# Patient Record
Sex: MALE | Race: WHITE | Employment: OTHER | URBAN - METROPOLITAN AREA
[De-identification: names, ages, dates, MRNs, and addresses within clinical notes are randomized per-mention and may not be internally consistent; named-entity substitution may affect disease eponyms.]

---

## 2019-04-19 ENCOUNTER — HOSPITAL ENCOUNTER (OUTPATIENT)
Age: 49
Discharge: HOME | End: 2019-04-19
Payer: COMMERCIAL

## 2019-04-19 ENCOUNTER — HOSPITAL ENCOUNTER (OUTPATIENT)
Dept: GENERAL RADIOLOGY | Age: 49
Discharge: HOME | End: 2019-04-19
Payer: COMMERCIAL

## 2019-04-19 DIAGNOSIS — M79.671 RIGHT FOOT PAIN: ICD-10-CM

## 2019-04-19 PROCEDURE — 73610 X-RAY EXAM OF ANKLE: CPT

## 2019-04-19 PROCEDURE — 73630 X-RAY EXAM OF FOOT: CPT

## 2019-04-24 ENCOUNTER — HOSPITAL ENCOUNTER (OUTPATIENT)
Dept: VASCULAR LAB | Age: 49
Discharge: HOME | End: 2019-04-24
Payer: COMMERCIAL

## 2019-04-24 PROCEDURE — 93971 EXTREMITY STUDY: CPT

## 2019-05-01 ENCOUNTER — HOSPITAL ENCOUNTER (OUTPATIENT)
Dept: VASCULAR LAB | Age: 49
Discharge: HOME | End: 2019-05-01
Payer: COMMERCIAL

## 2019-05-01 PROCEDURE — 93880 EXTRACRANIAL BILAT STUDY: CPT

## 2019-05-01 PROCEDURE — 93926 LOWER EXTREMITY STUDY: CPT

## 2019-05-17 ENCOUNTER — OFFICE VISIT (OUTPATIENT)
Dept: VASCULAR SURGERY | Age: 49
End: 2019-05-17
Payer: COMMERCIAL

## 2019-05-17 VITALS
DIASTOLIC BLOOD PRESSURE: 92 MMHG | WEIGHT: 271 LBS | OXYGEN SATURATION: 98 % | HEIGHT: 72 IN | BODY MASS INDEX: 36.7 KG/M2 | HEART RATE: 101 BPM | SYSTOLIC BLOOD PRESSURE: 142 MMHG

## 2019-05-17 DIAGNOSIS — M79.606 ISCHEMIC REST PAIN OF LOWER EXTREMITY: ICD-10-CM

## 2019-05-17 DIAGNOSIS — I99.8 ISCHEMIC REST PAIN OF LOWER EXTREMITY: ICD-10-CM

## 2019-05-17 DIAGNOSIS — I73.9 PAD (PERIPHERAL ARTERY DISEASE) (HCC): Primary | ICD-10-CM

## 2019-05-17 DIAGNOSIS — Z72.0 TOBACCO ABUSE: ICD-10-CM

## 2019-05-17 DIAGNOSIS — I25.10 CORONARY ARTERY DISEASE INVOLVING NATIVE CORONARY ARTERY OF NATIVE HEART WITHOUT ANGINA PECTORIS: ICD-10-CM

## 2019-05-17 PROCEDURE — 99243 OFF/OP CNSLTJ NEW/EST LOW 30: CPT | Performed by: SURGERY

## 2019-05-17 PROCEDURE — G8427 DOCREV CUR MEDS BY ELIG CLIN: HCPCS | Performed by: SURGERY

## 2019-05-17 PROCEDURE — G8419 CALC BMI OUT NRM PARAM NOF/U: HCPCS | Performed by: SURGERY

## 2019-05-17 SDOH — HEALTH STABILITY: MENTAL HEALTH: HOW OFTEN DO YOU HAVE A DRINK CONTAINING ALCOHOL?: 4 OR MORE TIMES A WEEK

## 2019-05-17 SDOH — HEALTH STABILITY: MENTAL HEALTH: HOW MANY STANDARD DRINKS CONTAINING ALCOHOL DO YOU HAVE ON A TYPICAL DAY?: 5 OR 6

## 2019-05-17 NOTE — PROGRESS NOTES
Outpatient Consultation / H&P    Date of Consultation:  5/17/2019    PCP:  PHANI Cunningham CNP     Referring Provider:  Dr. Júnior Ibarra     Chief Complaint:   Chief Complaint   Patient presents with    Circulatory Problem     patient was ref by Seble Suárez cnp FOR lower extremity vascular disease. pamlr        History of Present Illness: We are asked to see this patient in consultation by Dr. Júnior Ibarra regarding severe leg pain. Florentino Linton is a 50 y.o. male who has a history of CAD S/P PCI. He reports severe claudication RLE. He can only walk a few feet before severe cramping R calf. He also reports being woken up at night due to pain and cramping in foot. He denies any skin breadown, toe ulcers. Past Medical History:  Past Medical History:   Diagnosis Date    CAD (coronary artery disease)     Hyperlipidemia     Hypertension     [facility] infarction) [facility] [address]     Pulmonary nodule     S/P coronary artery stent placement        Past Surgical History:  Past Surgical History:   Procedure Laterality Date    CORONARY ANGIOPLASTY WITH STENT PLACEMENT  2000's    CORONARY ANGIOPLASTY WITH STENT PLACEMENT  2000's    x 2       Home Medications:   Prior to Admission medications    Medication Sig Start Date End Date Taking?  Authorizing Provider   Multiple Vitamins-Minerals (THERAPEUTIC MULTIVITAMIN-MINERALS) tablet Take 1 tablet by mouth daily   Yes Historical Provider, MD   ibuprofen (ADVIL;MOTRIN) 600 MG tablet Take 1 tablet by mouth every 6 hours as needed for Pain 9/29/15  Yes MARGARETTE Granados   atorvastatin (LIPITOR) 40 MG tablet Take 40 mg by mouth daily   Yes Historical Provider, MD   gabapentin (NEURONTIN) 300 MG capsule Take 300 mg by mouth 3 times daily   Yes Historical Provider, MD   diclofenac (CATAFLAM) 50 MG tablet Take 50 mg by mouth 2 times daily   Yes Historical Provider, MD   naproxen (NAPROSYN) 500 MG tablet Take 500 mg by mouth 2 times daily (with meals)   Yes Historical Provider, MD   ondansetron (ZOFRAN ODT) 4 MG disintegrating tablet Take 1 tablet by mouth every 8 hours as needed for Nausea or Vomiting 12/24/16   Shirley Roach MD   guaiFENesin-dextromethorphan [facility] DM) 100-10 MG/5ML syrup Take 5 mLs by mouth 3 times daily as needed for Cough 12/24/16   Shirley Roach MD        Allergies:  Codeine; Flexeril [cyclobenzaprine hcl]; and Vicodin [hydrocodone-acetaminophen]      Social History:      Social History     Socioeconomic History    Marital status: Single     Spouse name: Not on file    Number of children: Not on file    Years of education: Not on file    Highest education level: Not on file   Occupational History    Occupation: disabled-was a    Social Needs    Financial resource strain: Not on file    Food insecurity:     Worry: Not on file     Inability: Not on file   [employer] needs:     Medical: Not on file     Non-medical: Not on file   Tobacco Use    Smoking status: Current Every Day Smoker     Packs/day: 0.50     Years: 40.00     Pack years: 20.00     Types: Cigarettes    Smokeless tobacco: Never Used    Tobacco comment: 1/2ppd   Substance and Sexual Activity    Alcohol use:  Yes     Alcohol/week: 0.0 oz     Frequency: 4 or more times a week     Drinks per session: 5 or 6     Comment: 6 pack day    Drug use: No    Sexual activity: Not on file   Lifestyle    Physical activity:     Days per week: Not on file     Minutes per session: Not on file    Stress: Not on file   Relationships    Social connections:     Talks on phone: Not on file     Gets together: Not on file     Attends Muslim service: Not on file     Active member of club or organization: Not on file     Attends meetings of clubs or organizations: Not on file     Relationship status: Not on file    Intimate partner violence:     Fear of current or ex partner: Not on file     Emotionally abused: Not on file     Physically abused: Not on file     Forced sexual activity: Not on file   Other Topics Concern    Not on file   Social History Narrative    Not on file       Family History:        Adopted: Yes   Problem Relation Age of Onset    Heart Disease Other         dont know-pt adopted    Heart Failure Neg Hx     Asthma Neg Hx     Cancer Neg Hx     Diabetes Neg Hx     Emphysema Neg Hx     Hypertension Neg Hx        Review of Systems:  A 14 point review of systems was completed. Pertinent positives identified in the HPI, all other review of systems negative. Physical Examination:    BP (!) 142/92 (Site: Right Upper Arm)   Pulse 101   Ht 6' (1.829 m)   Wt 271 lb (122.9 kg)   SpO2 98%   BMI 36.75 kg/m²     Weight: 271 lb (122.9 kg)       General appearance: NAD  Eyes: PERRLA  Neck: no JVD, no lymphadenopathy. Respiratory: effort is unlabored, no crackles, wheezes or rubs. Cardiovascular: regular, no murmur. No carotid bruits. No edema or varicosities. Pulses:    femoral DP PT   RIGHT - - -   LEFT - 2 2   GI: abdomen soft, nondistended, no organomegaly. Musculoskeletal: strength and tone normal.  Extremities: warm and pink. Skin: no dermatitis or ulceration. Neuro/psychiatric: grossly intact. MEDICAL DECISION MAKING/TESTING      Lower extremity duplex:    Impressions   Right Impression   1. The right ankle/brachial index is 0.49.   2. There are no focal elevated velocities in the lower extremity . 3. There is abnormal monophasic flow throughout the lower extremity . 4. There is minimal to moderate plaque seen involving the lower extremity. Left Impression   1. The left ankle/brachial index is 0.74.       Conclusions        Summary        1. There is severe arterial insufficiency at rest involving the right lower    extremity due to inflow disease.    2. The left ankle brachial index is 0.74             Assessment:      Diagnosis Orders   1. PAD (peripheral artery disease) ([address]     2. Ischemic rest pain of lower extremity ([address]     3. Tobacco abuse     4. Coronary artery disease involving native coronary artery of native heart without angina pectoris     5. R Carotid occlusion      Recommendations/Plan:    Angiography with possible intervention. May not be able to cross, if so then will use left radial access. I have discussed all risks (including but not limited to bleeding, thrombosis, contrast allergy, renal failure, and early and late failure of intervention), benefits and alternatives of catheter-based angiography. Patient freely consents and is eager to proceed. All questions and expectations addressed. Asymptomatic Carotid occlusion. Yearly duplex to follow left.       Meño Muñoz MD, FACS

## 2019-05-25 ENCOUNTER — HOSPITAL ENCOUNTER (INPATIENT)
Age: 49
LOS: 1 days | Discharge: HOME | DRG: 174 | End: 2019-05-26
Attending: EMERGENCY MEDICINE | Admitting: INTERNAL MEDICINE
Payer: COMMERCIAL

## 2019-05-25 ENCOUNTER — APPOINTMENT (OUTPATIENT)
Dept: GENERAL RADIOLOGY | Age: 49
DRG: 174 | End: 2019-05-25
Payer: COMMERCIAL

## 2019-05-25 DIAGNOSIS — I21.3 ACUTE ST ELEVATION MYOCARDIAL INFARCTION (STEMI), UNSPECIFIED ARTERY (HCC): Primary | ICD-10-CM

## 2019-05-25 PROBLEM — I21.19 ACUTE TRANSMURAL INFERIOR WALL MI (HCC): Status: ACTIVE | Noted: 2019-05-25

## 2019-05-25 PROBLEM — F10.20 CHRONIC ALCOHOLISM (HCC): Status: ACTIVE | Noted: 2019-05-25

## 2019-05-25 LAB
A/G RATIO: 1.2 (ref 1.1–2.2)
ALBUMIN SERPL-MCNC: 3.9 G/DL (ref 3.4–5)
ALP BLD-CCNC: 68 U/L (ref 40–129)
ALT SERPL-CCNC: 9 U/L (ref 10–40)
ANION GAP SERPL CALCULATED.3IONS-SCNC: 16 MMOL/L (ref 3–16)
APTT: 27.6 SEC (ref 26–36)
APTT: 28.4 SEC (ref 26–36)
AST SERPL-CCNC: 18 U/L (ref 15–37)
BASE EXCESS VENOUS: -7.7 MMOL/L (ref -3–3)
BASOPHILS ABSOLUTE: 0.1 K/UL (ref 0–0.2)
BASOPHILS RELATIVE PERCENT: 1.2 %
BILIRUB SERPL-MCNC: <0.2 MG/DL (ref 0–1)
BUN BLDV-MCNC: 10 MG/DL (ref 7–20)
CALCIUM SERPL-MCNC: 9.3 MG/DL (ref 8.3–10.6)
CARBOXYHEMOGLOBIN: 6.6 % (ref 0–1.5)
CHLORIDE BLD-SCNC: 102 MMOL/L (ref 99–110)
CHOLESTEROL, TOTAL: 217 MG/DL (ref 0–199)
CO2: 21 MMOL/L (ref 21–32)
CREAT SERPL-MCNC: 0.8 MG/DL (ref 0.9–1.3)
EOSINOPHILS ABSOLUTE: 0.2 K/UL (ref 0–0.6)
EOSINOPHILS RELATIVE PERCENT: 2.4 %
GFR AFRICAN AMERICAN: >60
GFR NON-AFRICAN AMERICAN: >60
GLOBULIN: 3.2 G/DL
GLUCOSE BLD-MCNC: 140 MG/DL (ref 70–99)
HCO3 VENOUS: 17.4 MMOL/L (ref 23–29)
HCT VFR BLD CALC: 44.3 % (ref 40.5–52.5)
HDLC SERPL-MCNC: 22 MG/DL (ref 40–60)
HEMOGLOBIN: 15.1 G/DL (ref 13.5–17.5)
INR BLD: 1.09 (ref 0.86–1.14)
LDL CHOLESTEROL CALCULATED: 136 MG/DL
LEFT VENTRICULAR EJECTION FRACTION HIGH VALUE: 40 %
LYMPHOCYTES ABSOLUTE: 2.5 K/UL (ref 1–5.1)
LYMPHOCYTES RELATIVE PERCENT: 25.2 %
MCH RBC QN AUTO: 32 PG (ref 26–34)
MCHC RBC AUTO-ENTMCNC: 34.1 G/DL (ref 31–36)
MCV RBC AUTO: 94.1 FL (ref 80–100)
METHEMOGLOBIN VENOUS: 0.4 %
MONOCYTES ABSOLUTE: 1 K/UL (ref 0–1.3)
MONOCYTES RELATIVE PERCENT: 10.2 %
NEUTROPHILS ABSOLUTE: 6.1 K/UL (ref 1.7–7.7)
NEUTROPHILS RELATIVE PERCENT: 61 %
O2 CONTENT, VEN: 18 VOL %
O2 SAT, VEN: 91 %
O2 THERAPY: ABNORMAL
PCO2, VEN: 34.6 MMHG (ref 40–50)
PDW BLD-RTO: 14 % (ref 12.4–15.4)
PH VENOUS: 7.32 (ref 7.35–7.45)
PLATELET # BLD: 253 K/UL (ref 135–450)
PMV BLD AUTO: 7.9 FL (ref 5–10.5)
PO2, VEN: 65.2 MMHG (ref 25–40)
POTASSIUM REFLEX MAGNESIUM: 4.3 MMOL/L (ref 3.5–5.1)
PRO-BNP: 74 PG/ML (ref 0–124)
PROTHROMBIN TIME: 12.4 SEC (ref 9.8–13)
RBC # BLD: 4.7 M/UL (ref 4.2–5.9)
SODIUM BLD-SCNC: 139 MMOL/L (ref 136–145)
TCO2 CALC VENOUS: 19 MMOL/L
TOTAL PROTEIN: 7.1 G/DL (ref 6.4–8.2)
TRIGL SERPL-MCNC: 296 MG/DL (ref 0–150)
TROPONIN: <0.01 NG/ML
VLDLC SERPL CALC-MCNC: 59 MG/DL
WBC # BLD: 10.1 K/UL (ref 4–11)

## 2019-05-25 PROCEDURE — 027034Z DILATION OF CORONARY ARTERY, ONE ARTERY WITH DRUG-ELUTING INTRALUMINAL DEVICE, PERCUTANEOUS APPROACH: ICD-10-PCS | Performed by: INTERNAL MEDICINE

## 2019-05-25 PROCEDURE — 2000000000 HC ICU R&B

## 2019-05-25 PROCEDURE — 83880 ASSAY OF NATRIURETIC PEPTIDE: CPT

## 2019-05-25 PROCEDURE — 84484 ASSAY OF TROPONIN QUANT: CPT

## 2019-05-25 PROCEDURE — 6360000002 HC RX W HCPCS: Performed by: EMERGENCY MEDICINE

## 2019-05-25 PROCEDURE — C1874 STENT, COATED/COV W/DEL SYS: HCPCS

## 2019-05-25 PROCEDURE — 80061 LIPID PANEL: CPT

## 2019-05-25 PROCEDURE — 94761 N-INVAS EAR/PLS OXIMETRY MLT: CPT

## 2019-05-25 PROCEDURE — C1894 INTRO/SHEATH, NON-LASER: HCPCS

## 2019-05-25 PROCEDURE — 6360000002 HC RX W HCPCS

## 2019-05-25 PROCEDURE — 85610 PROTHROMBIN TIME: CPT

## 2019-05-25 PROCEDURE — 93458 L HRT ARTERY/VENTRICLE ANGIO: CPT

## 2019-05-25 PROCEDURE — 2709999900 HC NON-CHARGEABLE SUPPLY

## 2019-05-25 PROCEDURE — 6360000002 HC RX W HCPCS: Performed by: INTERNAL MEDICINE

## 2019-05-25 PROCEDURE — 6370000000 HC RX 637 (ALT 250 FOR IP)

## 2019-05-25 PROCEDURE — 98960 EDU&TRN PT SELF-MGMT NQHP 1: CPT

## 2019-05-25 PROCEDURE — C1725 CATH, TRANSLUMIN NON-LASER: HCPCS

## 2019-05-25 PROCEDURE — 6360000004 HC RX CONTRAST MEDICATION

## 2019-05-25 PROCEDURE — 85025 COMPLETE CBC W/AUTO DIFF WBC: CPT

## 2019-05-25 PROCEDURE — 2500000003 HC RX 250 WO HCPCS

## 2019-05-25 PROCEDURE — B2111ZZ FLUOROSCOPY OF MULTIPLE CORONARY ARTERIES USING LOW OSMOLAR CONTRAST: ICD-10-PCS | Performed by: INTERNAL MEDICINE

## 2019-05-25 PROCEDURE — 82803 BLOOD GASES ANY COMBINATION: CPT

## 2019-05-25 PROCEDURE — 93000 ELECTROCARDIOGRAM COMPLETE: CPT | Performed by: INTERNAL MEDICINE

## 2019-05-25 PROCEDURE — 2580000003 HC RX 258

## 2019-05-25 PROCEDURE — 99152 MOD SED SAME PHYS/QHP 5/>YRS: CPT

## 2019-05-25 PROCEDURE — 99153 MOD SED SAME PHYS/QHP EA: CPT

## 2019-05-25 PROCEDURE — 4A023N7 MEASUREMENT OF CARDIAC SAMPLING AND PRESSURE, LEFT HEART, PERCUTANEOUS APPROACH: ICD-10-PCS | Performed by: INTERNAL MEDICINE

## 2019-05-25 PROCEDURE — C1769 GUIDE WIRE: HCPCS

## 2019-05-25 PROCEDURE — 85347 COAGULATION TIME ACTIVATED: CPT

## 2019-05-25 PROCEDURE — 83036 HEMOGLOBIN GLYCOSYLATED A1C: CPT

## 2019-05-25 PROCEDURE — 71045 X-RAY EXAM CHEST 1 VIEW: CPT

## 2019-05-25 PROCEDURE — 96375 TX/PRO/DX INJ NEW DRUG ADDON: CPT

## 2019-05-25 PROCEDURE — 2700000000 HC OXYGEN THERAPY PER DAY

## 2019-05-25 PROCEDURE — 92941 PRQ TRLML REVSC TOT OCCL AMI: CPT

## 2019-05-25 PROCEDURE — 6370000000 HC RX 637 (ALT 250 FOR IP): Performed by: INTERNAL MEDICINE

## 2019-05-25 PROCEDURE — 80053 COMPREHEN METABOLIC PANEL: CPT

## 2019-05-25 PROCEDURE — 85730 THROMBOPLASTIN TIME PARTIAL: CPT

## 2019-05-25 PROCEDURE — 92973 PRQ TRLUML C MCHN ASP THRMBC: CPT

## 2019-05-25 PROCEDURE — 96374 THER/PROPH/DIAG INJ IV PUSH: CPT

## 2019-05-25 PROCEDURE — 99291 CRITICAL CARE FIRST HOUR: CPT | Performed by: INTERNAL MEDICINE

## 2019-05-25 PROCEDURE — 93005 ELECTROCARDIOGRAM TRACING: CPT | Performed by: EMERGENCY MEDICINE

## 2019-05-25 PROCEDURE — C1887 CATHETER, GUIDING: HCPCS

## 2019-05-25 PROCEDURE — 2580000003 HC RX 258: Performed by: INTERNAL MEDICINE

## 2019-05-25 PROCEDURE — 99291 CRITICAL CARE FIRST HOUR: CPT

## 2019-05-25 PROCEDURE — 36415 COLL VENOUS BLD VENIPUNCTURE: CPT

## 2019-05-25 RX ORDER — ATORVASTATIN CALCIUM 80 MG/1
80 TABLET, FILM COATED ORAL NIGHTLY
Status: DISCONTINUED | OUTPATIENT
Start: 2019-05-25 | End: 2019-05-26 | Stop reason: HOSPADM

## 2019-05-25 RX ORDER — LISINOPRIL 2.5 MG/1
2.5 TABLET ORAL DAILY
Status: DISCONTINUED | OUTPATIENT
Start: 2019-05-25 | End: 2019-05-26

## 2019-05-25 RX ORDER — GUAIFENESIN/DEXTROMETHORPHAN 100-10MG/5
5 SYRUP ORAL 3 TIMES DAILY PRN
Status: DISCONTINUED | OUTPATIENT
Start: 2019-05-25 | End: 2019-05-26 | Stop reason: HOSPADM

## 2019-05-25 RX ORDER — FENTANYL CITRATE 50 UG/ML
25 INJECTION, SOLUTION INTRAMUSCULAR; INTRAVENOUS ONCE
Status: COMPLETED | OUTPATIENT
Start: 2019-05-25 | End: 2019-05-25

## 2019-05-25 RX ORDER — MORPHINE SULFATE 4 MG/ML
4 INJECTION, SOLUTION INTRAMUSCULAR; INTRAVENOUS ONCE
Status: COMPLETED | OUTPATIENT
Start: 2019-05-25 | End: 2019-05-25

## 2019-05-25 RX ORDER — OXYCODONE HYDROCHLORIDE AND ACETAMINOPHEN 5; 325 MG/1; MG/1
1 TABLET ORAL EVERY 6 HOURS PRN
Status: DISCONTINUED | OUTPATIENT
Start: 2019-05-25 | End: 2019-05-26 | Stop reason: HOSPADM

## 2019-05-25 RX ORDER — FENTANYL CITRATE 50 UG/ML
50 INJECTION, SOLUTION INTRAMUSCULAR; INTRAVENOUS ONCE
Status: COMPLETED | OUTPATIENT
Start: 2019-05-25 | End: 2019-05-25

## 2019-05-25 RX ORDER — ONDANSETRON 2 MG/ML
4 INJECTION INTRAMUSCULAR; INTRAVENOUS ONCE
Status: COMPLETED | OUTPATIENT
Start: 2019-05-25 | End: 2019-05-25

## 2019-05-25 RX ORDER — ONDANSETRON 4 MG/1
4 TABLET, ORALLY DISINTEGRATING ORAL EVERY 8 HOURS PRN
Status: DISCONTINUED | OUTPATIENT
Start: 2019-05-25 | End: 2019-05-26 | Stop reason: HOSPADM

## 2019-05-25 RX ORDER — SODIUM CHLORIDE 9 MG/ML
INJECTION, SOLUTION INTRAVENOUS
Status: COMPLETED
Start: 2019-05-25 | End: 2019-05-25

## 2019-05-25 RX ORDER — SODIUM CHLORIDE 0.9 % (FLUSH) 0.9 %
10 SYRINGE (ML) INJECTION EVERY 12 HOURS SCHEDULED
Status: DISCONTINUED | OUTPATIENT
Start: 2019-05-25 | End: 2019-05-26 | Stop reason: HOSPADM

## 2019-05-25 RX ORDER — SODIUM CHLORIDE 0.9 % (FLUSH) 0.9 %
10 SYRINGE (ML) INJECTION PRN
Status: DISCONTINUED | OUTPATIENT
Start: 2019-05-25 | End: 2019-05-26 | Stop reason: HOSPADM

## 2019-05-25 RX ORDER — MIDAZOLAM HYDROCHLORIDE 1 MG/ML
1 INJECTION INTRAMUSCULAR; INTRAVENOUS ONCE
Status: COMPLETED | OUTPATIENT
Start: 2019-05-25 | End: 2019-05-25

## 2019-05-25 RX ORDER — M-VIT,TX,IRON,MINS/CALC/FOLIC 27MG-0.4MG
1 TABLET ORAL DAILY
Status: DISCONTINUED | OUTPATIENT
Start: 2019-05-25 | End: 2019-05-26 | Stop reason: HOSPADM

## 2019-05-25 RX ORDER — ATROPINE SULFATE 0.1 MG/ML
INJECTION INTRAVENOUS
Status: DISPENSED
Start: 2019-05-25 | End: 2019-05-25

## 2019-05-25 RX ORDER — CARVEDILOL 3.12 MG/1
3.12 TABLET ORAL 2 TIMES DAILY WITH MEALS
Status: DISCONTINUED | OUTPATIENT
Start: 2019-05-25 | End: 2019-05-26

## 2019-05-25 RX ORDER — MORPHINE SULFATE 4 MG/ML
INJECTION, SOLUTION INTRAMUSCULAR; INTRAVENOUS
Status: COMPLETED
Start: 2019-05-25 | End: 2019-05-25

## 2019-05-25 RX ORDER — DOPAMINE HYDROCHLORIDE 160 MG/100ML
5 INJECTION, SOLUTION INTRAVENOUS ONCE
Status: COMPLETED | OUTPATIENT
Start: 2019-05-25 | End: 2019-05-25

## 2019-05-25 RX ORDER — ONDANSETRON 2 MG/ML
INJECTION INTRAMUSCULAR; INTRAVENOUS
Status: COMPLETED
Start: 2019-05-25 | End: 2019-05-25

## 2019-05-25 RX ORDER — ACETAMINOPHEN 325 MG/1
650 TABLET ORAL EVERY 4 HOURS PRN
Status: DISCONTINUED | OUTPATIENT
Start: 2019-05-25 | End: 2019-05-26 | Stop reason: HOSPADM

## 2019-05-25 RX ORDER — SODIUM CHLORIDE 9 MG/ML
1000 INJECTION, SOLUTION INTRAVENOUS CONTINUOUS
Status: DISCONTINUED | OUTPATIENT
Start: 2019-05-25 | End: 2019-05-26

## 2019-05-25 RX ORDER — ASPIRIN 81 MG/1
81 TABLET, CHEWABLE ORAL DAILY
Status: DISCONTINUED | OUTPATIENT
Start: 2019-05-26 | End: 2019-05-26 | Stop reason: HOSPADM

## 2019-05-25 RX ORDER — HEPARIN SODIUM 1000 [USP'U]/ML
4000 INJECTION, SOLUTION INTRAVENOUS; SUBCUTANEOUS ONCE
Status: COMPLETED | OUTPATIENT
Start: 2019-05-25 | End: 2019-05-25

## 2019-05-25 RX ORDER — GABAPENTIN 300 MG/1
300 CAPSULE ORAL 3 TIMES DAILY
Status: DISCONTINUED | OUTPATIENT
Start: 2019-05-25 | End: 2019-05-26 | Stop reason: HOSPADM

## 2019-05-25 RX ORDER — MIDAZOLAM HYDROCHLORIDE 1 MG/ML
2 INJECTION INTRAMUSCULAR; INTRAVENOUS ONCE
Status: COMPLETED | OUTPATIENT
Start: 2019-05-25 | End: 2019-05-25

## 2019-05-25 RX ORDER — HEPARIN SODIUM 1000 [USP'U]/ML
8000 INJECTION, SOLUTION INTRAVENOUS; SUBCUTANEOUS ONCE
Status: COMPLETED | OUTPATIENT
Start: 2019-05-25 | End: 2019-05-25

## 2019-05-25 RX ADMIN — DOPAMINE HYDROCHLORIDE 5 MCG/KG/MIN: 160 INJECTION, SOLUTION INTRAVENOUS at 04:36

## 2019-05-25 RX ADMIN — FENTANYL CITRATE 25 MCG: 50 INJECTION, SOLUTION INTRAMUSCULAR; INTRAVENOUS at 04:52

## 2019-05-25 RX ADMIN — MIDAZOLAM HYDROCHLORIDE 1 MG: 1 INJECTION INTRAMUSCULAR; INTRAVENOUS at 04:52

## 2019-05-25 RX ADMIN — TIROFIBAN 0.15 MCG/KG/MIN: 5 INJECTION, SOLUTION INTRAVENOUS at 04:38

## 2019-05-25 RX ADMIN — FENTANYL CITRATE 25 MCG: 50 INJECTION, SOLUTION INTRAMUSCULAR; INTRAVENOUS at 04:06

## 2019-05-25 RX ADMIN — CARVEDILOL 3.12 MG: 3.12 TABLET, FILM COATED ORAL at 17:15

## 2019-05-25 RX ADMIN — MIDAZOLAM HYDROCHLORIDE 1 MG: 1 INJECTION INTRAMUSCULAR; INTRAVENOUS at 04:35

## 2019-05-25 RX ADMIN — FENTANYL CITRATE 25 MCG: 50 INJECTION, SOLUTION INTRAMUSCULAR; INTRAVENOUS at 04:35

## 2019-05-25 RX ADMIN — SODIUM CHLORIDE 1000 ML: 9 INJECTION, SOLUTION INTRAVENOUS at 17:00

## 2019-05-25 RX ADMIN — HEPARIN SODIUM 8000 UNITS: 1000 INJECTION, SOLUTION INTRAVENOUS; SUBCUTANEOUS at 04:24

## 2019-05-25 RX ADMIN — ONDANSETRON 4 MG: 2 INJECTION INTRAMUSCULAR; INTRAVENOUS at 02:50

## 2019-05-25 RX ADMIN — Medication 10 ML: at 20:34

## 2019-05-25 RX ADMIN — FENTANYL CITRATE 50 MCG: 50 INJECTION, SOLUTION INTRAMUSCULAR; INTRAVENOUS at 03:14

## 2019-05-25 RX ADMIN — MORPHINE SULFATE 4 MG: 4 INJECTION, SOLUTION INTRAMUSCULAR; INTRAVENOUS at 02:51

## 2019-05-25 RX ADMIN — SODIUM CHLORIDE 1000 ML: 9 INJECTION, SOLUTION INTRAVENOUS at 02:51

## 2019-05-25 RX ADMIN — MORPHINE SULFATE 4 MG: 4 INJECTION INTRAVENOUS at 03:02

## 2019-05-25 RX ADMIN — MORPHINE SULFATE 4 MG: 4 INJECTION INTRAVENOUS at 02:51

## 2019-05-25 RX ADMIN — OXYCODONE HYDROCHLORIDE AND ACETAMINOPHEN 1 TABLET: 5; 325 TABLET ORAL at 17:45

## 2019-05-25 RX ADMIN — Medication 10 ML: at 09:22

## 2019-05-25 RX ADMIN — HEPARIN SODIUM 4000 UNITS: 1000 INJECTION, SOLUTION INTRAVENOUS; SUBCUTANEOUS at 03:03

## 2019-05-25 RX ADMIN — MIDAZOLAM HYDROCHLORIDE 2 MG: 1 INJECTION INTRAMUSCULAR; INTRAVENOUS at 03:42

## 2019-05-25 RX ADMIN — GABAPENTIN 300 MG: 300 CAPSULE ORAL at 17:15

## 2019-05-25 RX ADMIN — GABAPENTIN 300 MG: 300 CAPSULE ORAL at 20:27

## 2019-05-25 RX ADMIN — ATORVASTATIN CALCIUM 80 MG: 80 TABLET, FILM COATED ORAL at 20:27

## 2019-05-25 RX ADMIN — TIROFIBAN 0.15 MCG/KG/MIN: 5 INJECTION, SOLUTION INTRAVENOUS at 10:45

## 2019-05-25 RX ADMIN — TICAGRELOR 90 MG: 90 TABLET ORAL at 20:27

## 2019-05-25 RX ADMIN — FENTANYL CITRATE 25 MCG: 50 INJECTION, SOLUTION INTRAMUSCULAR; INTRAVENOUS at 03:42

## 2019-05-25 ASSESSMENT — PAIN DESCRIPTION - LOCATION
LOCATION: BACK
LOCATION: CHEST

## 2019-05-25 ASSESSMENT — PAIN DESCRIPTION - ORIENTATION
ORIENTATION: LOWER
ORIENTATION: LEFT

## 2019-05-25 ASSESSMENT — PAIN DESCRIPTION - PAIN TYPE
TYPE: ACUTE PAIN
TYPE: CHRONIC PAIN

## 2019-05-25 ASSESSMENT — PAIN SCALES - GENERAL
PAINLEVEL_OUTOF10: 0
PAINLEVEL_OUTOF10: 8
PAINLEVEL_OUTOF10: 10
PAINLEVEL_OUTOF10: 8
PAINLEVEL_OUTOF10: 0
PAINLEVEL_OUTOF10: 10
PAINLEVEL_OUTOF10: 10

## 2019-05-25 ASSESSMENT — PAIN DESCRIPTION - PROGRESSION: CLINICAL_PROGRESSION: NOT CHANGED

## 2019-05-25 ASSESSMENT — PAIN DESCRIPTION - FREQUENCY: FREQUENCY: CONTINUOUS

## 2019-05-25 ASSESSMENT — PAIN DESCRIPTION - DIRECTION: RADIATING_TOWARDS: LEFT ARM

## 2019-05-25 ASSESSMENT — PAIN DESCRIPTION - DESCRIPTORS: DESCRIPTORS: CONSTANT

## 2019-05-25 ASSESSMENT — PAIN DESCRIPTION - ONSET: ONSET: ON-GOING

## 2019-05-25 NOTE — ED NOTES
Bed: 09  Expected date:   Expected time:   Means of arrival:   Comments:  NANCY Barboza, CHRIS  05/25/19 0877

## 2019-05-25 NOTE — PROGRESS NOTES
Pt arrived from cath lab. Assumed care of patient. Shift assessment complete and documented on flowsheets. VSS. Four eyes skin assessment and MAR handoff completed with previous RN. Repositioned for comfort. Call light and over bed table within reach. Will continue to monitor.

## 2019-05-25 NOTE — PROGRESS NOTES
[id number] team called in  [phone] Cath team member called and verified they are on their way

## 2019-05-25 NOTE — PROGRESS NOTES
Patient remains in flat postion post cath/post sheath removal.  Back pain has continued to increase on lower left side with radiation down left leg. Patient states this is common when lying flat for a long period of time. Multiple interventions unsuccessful - repositioning, pillow support. PRN pain medication ordered and administered.   Will monitor    Abby Strickland RN

## 2019-05-25 NOTE — PROGRESS NOTES
[address] [facility] Facility: [facility] From: Charleen Booker RE: Diego Alicea 1970 RM: 231 PRN pain medication. patient still remains flat post cath procedure and x2 sheath removals. 8/10 back pain currently. can you order something to help? tried other non-pharmaceutical interventions with no success. Need Callback: NO CALLBACK REQ CCU    DR. Herman michel served     Johanna Hernández RN

## 2019-05-25 NOTE — PROGRESS NOTES
Pt unable to urinate \"trouble peeing when people are looking\" - tried multiple times. Bladder scan - 750 ml - straight cath completed 875 ml removed    Patient comfortable. Remains in flat for sheath removal protocol until 1915 05/25/19.     Adriana Francois RN

## 2019-05-25 NOTE — PRE SEDATION
Brief Pre-Op Note/Sedation Assessment      Leann Gillette  1970  Cath Pool Rm/NONE  [id number]  5:10 AM    Planned Procedure: Cardiac Catheterization Procedure    Post Procedure Plan: Return to same level of care    Consent: I have discussed with the patient and/or the patient representative the indication, alternatives, and the possible risks and/or complications of the planned procedure and the anesthesia methods. The patient and/or patient representative appear to understand and agree to proceed. Chief Complaint: STEMI      Indications for Cath Procedure:  ACS <= 24 hrs  Anginal Classification within 2 weeks:  CCS IV - Inability to perform any activity without angina or angina at rest, i.e., severe limitation  NYHA Heart Failure Class within 2 weeks: No symptoms    Anti- Anginal Meds within 2 weeks:   Yes: Statin (Any)    Stress or Imaging Studies Performed:  None    Vital Signs:  /89   Pulse 102   Resp 10   Ht 6' 2\" (1.88 m)   Wt 271 lb (122.9 kg)   SpO2 98%   BMI 34.79 kg/m²     Allergies:   Allergies   Allergen Reactions    Codeine     Flexeril [Cyclobenzaprine Hcl] Other (See Comments)     Puts him to sleep    Vicodin [Hydrocodone-Acetaminophen]        Past Medical History:  Past Medical History:   Diagnosis Date    CAD (coronary artery disease)     Hyperlipidemia     Hypertension     MI (myocardial infarction) ([address]     [facility] [address]     Pulmonary nodule     S/P coronary artery stent placement          Surgical History:  Past Surgical History:   Procedure Laterality Date    CORONARY ANGIOPLASTY WITH STENT PLACEMENT  2000's    CORONARY ANGIOPLASTY WITH STENT PLACEMENT  2000's    x 2         Medications:  Current Facility-Administered Medications   Medication Dose Route Frequency Provider Last Rate Last Dose    0.9 % sodium chloride infusion  1,000 mL Intravenous Continuous Fatuma Arpan V,  mL/hr at 05/25/19 0251 1,000 mL at 05/25/19 0251    tirofiban (AGGRASTAT) 50 mcg/mL infusion  0.15 mcg/kg/min Intravenous Continuous Pastor Razo MD 22.1 mL/hr at 05/25/19 0438 0.15 mcg/kg/min at 05/25/19 0756           Pre-Sedation:    Pre-Sedation Documentation and Exam:  I have personally completed a history, physical exam & review of systems for this patient (see notes). Prior History of Anesthesia Complications:   none    Modified Mallampati:  I (soft palate, uvula, fauces, tonsillar pillars visible)    ASA Classification:  Class 2 -- A normal healthy patient with mild systemic disease    Anne Scale: Activity:  2 - Able to move 4 extremities voluntarily on command  Respiration:  2 - Able to breathe deeply and cough freely  Circulation:  2 - BP+/- 20mmHg of normal  Consciousness:  2 - Fully awake  Oxygen Saturation (color):  2 - Able to maintain oxygen saturation >92% on room air    Sedation/Anesthesia Plan:  Guard the patient's safety and welfare. Minimize physical discomfort and pain. Minimize negative psychological responses to treatment by providing sedation and analgesia and maximize the potential amnesia. Patient to meet pre-procedure discharge plan.     Medication Planned:  midazolam intravenously and fentanyl intravenously    Patient is an appropriate candidate for plan of sedation: yes      Electronically signed by Pastor Razo MD on 5/25/2019 at 5:10 AM

## 2019-05-25 NOTE — PROGRESS NOTES
Results for Burlene Spatz (MRN [id number]) as of 5/25/2019 09:23   Ref.  Range 5/25/2019 02:55 5/25/2019 08:37   aPTT Latest Ref Range: 26.0 - 36.0 sec 27.6 28.4       Tomasa Mckeon RN

## 2019-05-25 NOTE — H&P
Hospital Medicine History & Physical      PCP: PHANI Martínez CNP    Date of Admission: 5/25/2019    Date of Service: Pt seen/examined on 5/25/19 and Admitted to Inpatient with expected LOS greater than two midnights due to medical therapy. Chief Complaint:  CP      History Of Present Illness:    50 y.o. male who presented to [facility] with CP  Pt presented to the ER with c/o CP, location substernal, radiating to the left arm, started 45 minutes prior to arrival, quality heavy pressure sensation, associated with mild nausea and shortness of breath, no aggravating or relieving factors. Found to have ST elevation in the inferior leads, taken emergently to the Cath Lab and found to have stenosis in the RCA stent. Patient had PCI of the RCA back in 2012 by Dr. Bong Cruz. Past Medical History:          Diagnosis Date    CAD (coronary artery disease)     Hyperlipidemia     Hypertension     MI (myocardial infarction) ([address]     [facility]per[facility]     Pulmonary nodule     S/P coronary artery stent placement        Past Surgical History:          Procedure Laterality Date    CORONARY ANGIOPLASTY WITH STENT PLACEMENT  2000's    CORONARY ANGIOPLASTY WITH STENT PLACEMENT  2000's    x 2       Medications Prior to Admission:      Prior to Admission medications    Medication Sig Start Date End Date Taking?  Authorizing Provider   ondansetron (ZOFRAN ODT) 4 MG disintegrating tablet Take 1 tablet by mouth every 8 hours as needed for Nausea or Vomiting 12/24/16  Yes Michelle Dill MD   ibuprofen (ADVIL;MOTRIN) 600 MG tablet Take 1 tablet by mouth every 6 hours as needed for Pain 9/29/15  Yes MARGARETTE Granados   gabapentin (NEURONTIN) 300 MG capsule Take 300 mg by mouth 3 times daily   Yes Historical Provider, MD   diclofenac (CATAFLAM) 50 MG tablet Take 50 mg by mouth 2 times daily   Yes Historical Provider, MD   naproxen (NAPROSYN) 500 MG tablet Take 500 mg by mouth 2 times deformity. Skin: Skin color, texture, turgor normal.  No rashes or lesions. Neurologic:  Neurovascularly intact without any focal sensory/motor deficits. Cranial nerves: II-XII intact, grossly non-focal.  Psychiatric:  Alert and oriented, thought content appropriate, normal insight  Capillary Refill: Brisk,< 3 seconds   Peripheral Pulses: +2 palpable, equal bilaterally       Labs:     Recent Labs     05/25/19 0255   WBC 10.1   HGB 15.1   HCT 44.3        Recent Labs     05/25/19 0255      K 4.3      CO2 21   BUN 10   CREATININE 0.8*   CALCIUM 9.3     Recent Labs     05/25/19 0255   AST 18   ALT 9*   BILITOT <0.2   ALKPHOS 68     Recent Labs     05/25/19 0255   INR 1.09     Recent Labs     05/25/19 0255   TROPONINI <0.01       Urinalysis:      Lab Results   Component Value Date    BLOODU NEG 07/25/2012    SPECGRAV 1.030 07/25/2012    GLUCOSEU NEG 07/25/2012       Radiology:     CXR: I have reviewed the CXR with the following interpretation: No acute process   EKG:  I have reviewed the EKG with the following interpretation: st elev in inf leads    XR CHEST PORTABLE   Final Result   Low volume study with bibasilar atelectasis or pneumonitis. ASSESSMENT:PLAN:    Acute inferior wall MI  S/p LHC by   100% prox RCA In Stent Thrombosis - Rx with Penumbra Aspiration with large thrombus extracted and reestablished distal perfusion.   70% residual post aspiration lesion stented with 4.0 X 12 Jacinta to 16 TRINI near ostium   - LV gram ef 40%  - on DAPT, statin, BB, ACE-I  - ECHO  - cardiac rehab  - FLP, A1C in AM    CAD  S/p RCA stent in 2012  Patient was not taking the any meds, not even asa, poor compliance  Counseled adherence to medication regimen  Started on guideline meds    Hypotension during cath - treated with touch of vinayak and dopamine in cath Lab, currently on dopamine only, wean as tolerated    Tobacco abuse-counseled cessation    Chronic alcoholism-counseled cessation    Obesity-counseled weight loss, contributing to current presentation      DVT Prophylaxis: scd  Diet: DIET CARDIAC;  Code Status: Full Code    PT/OT Eval Status: NA    Dispo - IP stay    Total critical care time caring for this patient with life threatening, unstable organ failure, including direct patient contact, management of life support systems, review of data including imaging and labs, discussions with other team members and physicians at least 31 min so far today, excluding procedures. Adolfo Russ MD    Thank you PHANI Novak CNP for the opportunity to be involved in this patient's care. If you have any questions or concerns please feel free to contact me at [phone].

## 2019-05-25 NOTE — ED NOTES
Patient transported to cath lab via stretcher at this time via stretcher. Patient is alert during transport with belongings.  Patient does not have family present      [facility]  05/25/19 8051

## 2019-05-25 NOTE — ED PROVIDER NOTES
CHIEF COMPLAINT  Chest Pain (patient reprts chest pain started 45 minutes ago transported via ambulance )      Bryan Gee is a 50 y.o. male who presents to the ED complaining of CP started on L side 45m ago while getting ready for bed. C/o sob. States supposed to have surgery on his left leg next week for a \"nerve pain issue\". Smoker. Hx x2 stents 5 years ago. Not on blood thinners. Got ASA and x1 nitro by EMS. No dizziness or headache. No other complaints, modifying factors or associated symptoms. I have reviewed the following from the nursing documentation.     Past Medical History:   Diagnosis Date    CAD (coronary artery disease)     Hyperlipidemia     Hypertension     [facility] infarction) [facility] [address]     Pulmonary nodule     S/P coronary artery stent placement      Past Surgical History:   Procedure Laterality Date    CORONARY ANGIOPLASTY WITH STENT PLACEMENT  2000's    CORONARY ANGIOPLASTY WITH STENT PLACEMENT  2000's    x 2     Family History   Adopted: Yes   Problem Relation Age of Onset    Heart Disease Other         dont know-pt adopted    Heart Failure Neg Hx     Asthma Neg Hx     Cancer Neg Hx     Diabetes Neg Hx     Emphysema Neg Hx     Hypertension Neg Hx      Social History     Socioeconomic History    Marital status: Single     Spouse name: Not on file    Number of children: Not on file    Years of education: Not on file    Highest education level: Not on file   Occupational History    Occupation: disabled-was a    Social Needs    Financial resource strain: Not on file    Food insecurity:     Worry: Not on file     Inability: Not on file   Basys needs:     Medical: Not on file     Non-medical: Not on file   Tobacco Use    Smoking status: Current Every Day Smoker     Packs/day: 0.50     Years: 40.00     Pack years: 20.00     Types: Cigarettes    Smokeless tobacco: Never Used    Tobacco comment: 1/2ppd   Substance and Sexual  atorvastatin (LIPITOR) 40 MG tablet Take 40 mg by mouth daily      gabapentin (NEURONTIN) 300 MG capsule Take 300 mg by mouth 3 times daily      diclofenac (CATAFLAM) 50 MG tablet Take 50 mg by mouth 2 times daily      naproxen (NAPROSYN) 500 MG tablet Take 500 mg by mouth 2 times daily (with meals)       Allergies   Allergen Reactions    Codeine     Flexeril [Cyclobenzaprine Hcl] Other (See Comments)     Puts him to sleep    Vicodin [Hydrocodone-Acetaminophen]        REVIEW OF SYSTEMS  10 systems reviewed, pertinent positives per HPI otherwise noted to be negative. PHYSICAL EXAM  /76   Pulse 109   Resp (!) 31   Ht 6' 2\" (1.88 m)   Wt 271 lb (122.9 kg)   SpO2 97%   BMI 34.79 kg/m²   GENERAL APPEARANCE: Awake and alert. In pain  HEAD: Normocephalic. Atraumatic. EYES: PERRL. EOM's grossly intact. ENT: Mucous membranes are moist.   NECK: Supple. HEART: RRR. LUNGS: tachypneic. CTAB. Good air exchange. BACK: No midline spinal tenderness or step-off. ABDOMEN: obese. Soft. Non-distended. Non-tender. No guarding or rebound. Normal bowel sounds. EXTREMITIES: No peripheral edema. Moves all extremities equally. All extremities neurovascularly intact. SKIN: pale, diaphoretic  NEUROLOGICAL: Alert and oriented. No gross facial drooping. Strength 5/5. PSYCHIATRIC: Normal mood and affect for situation    LABS  I have reviewed all labs for this visit.    Results for orders placed or performed during the hospital encounter of 05/25/19   CBC Auto Differential   Result Value Ref Range    WBC 10.1 4.0 - 11.0 K/uL    RBC 4.70 4.20 - 5.90 M/uL    Hemoglobin 15.1 13.5 - 17.5 g/dL    Hematocrit 44.3 40.5 - 52.5 %    MCV 94.1 80.0 - 100.0 fL    MCH 32.0 26.0 - 34.0 pg    MCHC 34.1 31.0 - 36.0 g/dL    RDW 14.0 12.4 - 15.4 %    Platelets 981 541 - 390 K/uL    MPV 7.9 5.0 - 10.5 fL    Neutrophils % 61.0 %    Lymphocytes % 25.2 %    Monocytes % 10.2 %    Eosinophils % 2.4 %    Basophils % 1.2 % Neutrophils # 6.1 1.7 - 7.7 K/uL    Lymphocytes # 2.5 1.0 - 5.1 K/uL    Monocytes # 1.0 0.0 - 1.3 K/uL    Eosinophils # 0.2 0.0 - 0.6 K/uL    Basophils # 0.1 0.0 - 0.2 K/uL   Comprehensive Metabolic Panel w/ Reflex to MG   Result Value Ref Range    Sodium 139 136 - 145 mmol/L    Potassium reflex Magnesium 4.3 3.5 - 5.1 mmol/L    Chloride 102 99 - 110 mmol/L    CO2 21 21 - 32 mmol/L    Anion Gap 16 3 - 16    Glucose 140 (H) 70 - 99 mg/dL    BUN 10 7 - 20 mg/dL    CREATININE 0.8 (L) 0.9 - 1.3 mg/dL    GFR Non-African American >60 >60    GFR African American >60 >60    Calcium 9.3 8.3 - 10.6 mg/dL    Total Protein 7.1 6.4 - 8.2 g/dL    Alb 3.9 3.4 - 5.0 g/dL    Albumin/Globulin Ratio 1.2 1.1 - 2.2    Total Bilirubin <0.2 0.0 - 1.0 mg/dL    Alkaline Phosphatase 68 40 - 129 U/L    ALT 9 (L) 10 - 40 U/L    AST 18 15 - 37 U/L    Globulin 3.2 g/dL   Troponin   Result Value Ref Range    Troponin <0.01 <0.01 ng/mL   Blood Gas, Venous   Result Value Ref Range    pH, Luisito 7.320 (L) 7.350 - 7.450    pCO2, Luisito 34.6 (L) 40.0 - 50.0 mmHg    pO2, Luisito 65.2 (H) 25.0 - 40.0 mmHg    HCO3, Venous 17.4 (L) 23.0 - 29.0 mmol/L    Base Excess, Luisito -7.7 (L) -3.0 - 3.0 mmol/L    O2 Sat, Luisito 91 Not Established %    Carboxyhemoglobin 6.6 (H) 0.0 - 1.5 %    MetHgb, Luisito 0.4 <1.5 %    TC02 (Calc), Luisito 19 Not Established mmol/L    O2 Content, Luisito 18 Not Established VOL %    O2 Therapy Unknown    Protime-INR   Result Value Ref Range    Protime 12.4 9.8 - 13.0 sec    INR 1.09 0.86 - 1.14   Brain Natriuretic Peptide   Result Value Ref Range    Pro-BNP 74 0 - 124 pg/mL   APTT   Result Value Ref Range    aPTT 27.6 26.0 - 36.0 sec   Lipid panel   Result Value Ref Range    Cholesterol, Total 217 (H) 0 - 199 mg/dL    Triglycerides 296 (H) 0 - 150 mg/dL    HDL 22 (L) 40 - 60 mg/dL    LDL Calculated 136 (H) <100 mg/dL    VLDL Cholesterol Calculated 59 Not Established mg/dL   APTT   Result Value Ref Range    aPTT 28.4 26.0 - 36.0 sec   Ejection Fraction Percentage   Result Value Ref Range    Left Ventricular Ejection Fraction High Value 40 %       EKG  EKG interpreted by me. STEMI, St elevations in II, III, aVF with depressions in 1, aVL, V2.       RADIOLOGY  X-RAYS:  I have reviewed radiologic plain film image(s). ALL OTHER NON-PLAIN FILM IMAGES SUCH AS CT, ULTRASOUND AND MRI HAVE BEEN READ BY THE RADIOLOGIST. XR CHEST PORTABLE   Final Result   Low volume study with bibasilar atelectasis or pneumonitis. CRITICAL CARE TIME ATTESTATION (40 minutes):    Due to the high probability of imminent or life-threatening deterioration secondary to dyspnea, possible infection, and new arrhythmia this patient required my direct attention, interventions and personal management. I personally provided 40 minutes of critical care time exclusive of time spent on separate billable procedures. Time includes review and interpretation of laboratory data, review and interpretation of radiology results, discussions with consultants as applicable while monitoring for potential decompensation. Interventions were otherwise performed as documented above. ED COURSE/MDM  Patient seen and evaluated. EKG consistent with inferior wall STEMI, VSS at this time aside from . PT diaphoretic on arrival. Got x3 ASA and x1 nitro from EMS wo relief. Given morphine 8mg IVP here for pain. Spoke with Dr Santos Rao of cardiology and texted him the EKG, agrees stemi. We will activate cath lab. Will also give fluids here and will give bolus of Heparin 4000U IV. Will hold any nitro for now. Pt was still in pain, now pain is better after some fentanyl. [place] Vega [address] [facility] with [address] [facility] Bird Fees       8727 - EKG texted to Cardiologist     [id number] - Cath lab activated     CLINICAL IMPRESSION  1. Acute ST elevation myocardial infarction (STEMI), unspecified artery (HCC)        Blood pressure 119/76, pulse 109, resp.  rate (!) 31, height 6' 2\" (1.88 m), weight 271 lb

## 2019-05-25 NOTE — PROGRESS NOTES
[address]   Daily Cardiovascular Progress Note    Admit Date: 5/25/2019    Chief complaint: chest pain  HPI:     Pt presented o/n with acute inferior stemi and had emergency cath with PCI of RCA. Doing better, fatigue/tired.        Medications/Labs all Reviewed:  Patient Active Problem List   Diagnosis    Acute inferior myocardial infarction ([facility] [address]    CAD (coronary artery disease)    Status post angioplasty with stent    HTN (hypertension)    Trochanteric bursitis    Mediastinal lymphadenopathy    Dyspnea    Tobacco abuse    Daytime somnolence    Obesity    Snoring    Acute transmural inferior wall MI (HCC)    Chronic alcoholism (HCC)       Medications:    0.9 % sodium chloride infusion Continuous   gabapentin (NEURONTIN) capsule 300 mg TID   therapeutic multivitamin-minerals 1 tablet Daily   ondansetron (ZOFRAN-ODT) disintegrating tablet 4 mg Q8H PRN   guaiFENesin-dextromethorphan (ROBITUSSIN DM) 100-10 MG/5ML syrup 5 mL TID PRN   sodium chloride flush 0.9 % injection 10 mL 2 times per day   sodium chloride flush 0.9 % injection 10 mL PRN   acetaminophen (TYLENOL) tablet 650 mg Q4H PRN   magnesium hydroxide (MILK OF MAGNESIA) 400 MG/5ML suspension 30 mL Daily PRN   carvedilol (COREG) tablet 3.125 mg BID WC   lisinopril (PRINIVIL;ZESTRIL) tablet 2.5 mg Daily   atorvastatin (LIPITOR) tablet 80 mg Nightly   ticagrelor (BRILINTA) tablet 90 mg BID   [START ON 5/26/2019] aspirin chewable tablet 81 mg Daily   perflutren lipid microspheres (DEFINITY) injection 1.65 mg ONCE PRN   atropine 1 MG/10ML injection           PHYSICAL EXAM   /70   Pulse 91   Temp 97.8 °F (36.6 °C) (Axillary)   Resp 18   Ht 6' 2\" (1.88 m)   Wt 279 lb 1.6 oz (126.6 kg)   SpO2 99%   BMI 35.83 kg/m²    Vitals:    05/25/19 1100 05/25/19 1200 05/25/19 1207 05/25/19 1300   BP: 103/69 122/77 107/79 104/70   Pulse: 95 97 95 91   Resp: 16 18 20 18   Temp:       TempSrc:       SpO2: 98% 98% 98% 99%   Weight: Height:           No intake or output data in the 24 hours ending 05/25/19 1408  Wt Readings from Last 3 Encounters:   05/25/19 279 lb 1.6 oz (126.6 kg)   05/17/19 271 lb (122.9 kg)   11/02/17 280 lb (127 kg)         Gen: Patient in NAD, resting comfortably  Neck: No JVD or bruits  Respiratory: CTAB no WRR  Chest: normal without deformity  Cardiovascular:RRR, S1S2,   Abdomen: Soft, NTND, Normal BS  Extremities: No clubbing, cyanosis, or edema  Neurological/Psychiatric: AxO x4, No gross motors/sensory deficits  Skin:  Warm and dry  bilat Groin: sheaths in place, +oozing      Labs:  CBC: Recent Labs     05/25/19  0255   WBC 10.1   HGB 15.1   HCT 44.3   MCV 94.1        BMP: Recent Labs     05/25/19  0255      K 4.3      CO2 21   BUN 10   CREATININE 0.8*     MG:  No results for input(s): MG in the last 72 hours. PT/INR:   Recent Labs     05/25/19  0255   PROTIME 12.4   INR 1.09     APTT:   Recent Labs     05/25/19  0255 05/25/19  0837   APTT 27.6 28.4     Cardiac Enzymes: Recent Labs     05/25/19 0255   TROPONINI <0.01       Cardiac Studies:    Cath:    Ejection Fraction Percentage   Order: [id number]   Status:  Final result   Visible to patient:  No (Not Released) Next appt:  05/28/2019 at 07:30 AM in IP Unit (SCHEDULE, [facility] CATH LAB [facility])    Ref Range & Units 5/25/19   Left Ventricular Ejection Fraction High Value % 40    Comment: cath,inferior hypokinesis               I have reviewed labs and imaging/xray/diagnostic testing in this note.     Assessment and Plan       Patient Active Problem List   Diagnosis    Acute inferior myocardial infarction ([facility] [address]    CAD (coronary artery disease)    Status post angioplasty with stent    HTN (hypertension)    Trochanteric bursitis    Mediastinal lymphadenopathy    Dyspnea    Tobacco abuse    Daytime somnolence    Obesity    Snoring    Acute transmural inferior wall MI ([address]    Chronic alcoholism ([facility] [address]       Acute inferior stemi,

## 2019-05-25 NOTE — ED NOTES
She at bedside discussing plan of care and procedure with patient at this time     Kera Villa, RN  05/25/19 3779

## 2019-05-25 NOTE — PROGRESS NOTES
ACT drawn and resulted below 170 per order. Patient currently on aggrastat and dopamine gtts. Patient instructed on removal procedure. Right venous sheath removed at 1055. Venous sheath removed without hematoma or oozing. Pulses present distally. Arterial sheath site without hematoma or oozing. Arterial sheath removed per policy @ 6550 without difficulty. Integrity of sheath inspected upon removal and no abnormalities noted. Manual pressure held 28 minutes minutes. Dry, sterile tegaderm applied. Pressure dressing applied. Patient tolerated well. Vital signs, groin checks, and pedal pulses will be completed per protocol (every 15 minutes X 4, every 30 minutes X 2, and every hour X 2 per protocol). Sheath removed by  Joaquin Zamorano with robert Walker RN present.      Brittney Carroll RN

## 2019-05-25 NOTE — CONSULTS
[address]   Cardiac Consultation    Referring Provider:  PHANI Johnson CNP     Chief Complaint   Patient presents with    Chest Pain     patient reprts chest pain started 45 minutes ago transported via ambulance         History of Present Illness:  49 yo describes onset of severe SSCP with radiation to L jaw and arm starting 45 minutes prior to arrival in the ER. Currently non-pleuritic with mild SOB. No palpitations or syncope. Drank 5 beers tonight and smokes cigarettes. Denies any drug use. ECG reviewed demonstrating inferior GUSTAVO. Hx of 4.0 X 38 RCA stent. No current AP RX. Past Medical History:   has a past medical history of CAD (coronary artery disease), Hyperlipidemia, Hypertension, MI (myocardial infarction) ([facility] [address] Pulmonary nodule, and S/P coronary artery stent placement. Surgical History:   has a past surgical history that includes Coronary angioplasty with stent (2000's) and Coronary angioplasty with stent (2000's). Social History:   reports that he has been smoking cigarettes. He has a 20.00 pack-year smoking history. He has never used smokeless tobacco. He reports that he drinks alcohol. He reports that he does not use drugs. Family History:  family history includes Heart Disease in an other family member. He was adopted. Home Medications:  See List    Allergies:  Codeine; Flexeril [cyclobenzaprine hcl]; and Vicodin [hydrocodone-acetaminophen]     Review of Systems:   · Constitutional: there has been no unanticipated weight loss. There's been no change in energy level, sleep pattern, or activity level. · Eyes: No visual changes or diplopia. No scleral icterus. · ENT: No Headaches, hearing loss or vertigo. No mouth sores or sore throat. · Cardiovascular: Reviewed in HPI  · Respiratory: No cough or wheezing, no sputum production. No hematemesis. · Gastrointestinal: No abdominal pain, appetite loss, blood in stools.  No change in bowel or bladder habits. · Genitourinary: No dysuria, trouble voiding, or hematuria. · Musculoskeletal:  No gait disturbance, weakness or joint complaints. · Integumentary: No rash or pruritis. · Neurological: No headache, diplopia, change in muscle strength, numbness or tingling. No change in gait, balance, coordination, mood, affect, memory, mentation, behavior. · Psychiatric: No anxiety, no depression. · Endocrine: No malaise, fatigue or temperature intolerance. No excessive thirst, fluid intake, or urination. No tremor. · Hematologic/Lymphatic: No abnormal bruising or bleeding, blood clots or swollen lymph nodes. · Allergic/Immunologic: No nasal congestion or hives. Physical Examination:    Vitals:    05/25/19 0323   BP: 103/89   Pulse: 102   Resp: 10   SpO2: 98%          Constitutional and General Appearance:   . Uncomfortable   SKIN:  .     Warm and dry  EYES:    .     EOMI  Neck:   . Normal carotid contour  Respiratory:  · Normal excursion and expansion without use of accessory muscles  · Resp Auscultation: Normal breath sounds without dullness  Cardiovascular:  · The apical impulses not displaced  · Heart tones are crisp and normal  · Cervical veins are not engorged  · Normal S1S2, No S3, No Murmur  · Peripheral pulses are symmetrical and full  Extremities:  · There is no clubbing, cyanosis of the extremities. · No edema  · Femoral Arteries: 2+ and equal  · Pedal Pulses: 2+ and equal   Abdomen:  · No masses or tenderness  · Liver/Spleen: No Abnormalities Noted  Neurological/Psychiatric:  · Alert and oriented in all spheres  · Moves all extremities well  · Exhibits normal gait balance and coordination  · No abnormalities of mood, affect, memory, mentation, or behavior are noted    All testing and labs listed below were personally reviewed. Discussed with the ER  Charts reviewed/ECG transmitted--Prior Cine reviewed    Assessment:     1.  Acute ST elevation myocardial infarction (STEMI), unspecified artery ([facility] [address]    2. Tobacco Use  3. Hx CAD with prior stents    Plan:  Cath/? PCI--Procedure and risks explained. Total critical care time was 40 minutes, excluding separately reportable procedures. Services, included in critical care time were chart data review, documentation time, obtaining info from patient, review of nursing notes, and vital sign assessment and management of the patient. There was a high probability of clinically significant life-threatening deterioration in the patient's condition, which required my urgent intervention. Due to the high probability of clinically significant life threating deterioration of the patient's condition that required my urgent intervention, a total critical care time 40 minutes was used. This time excludes any time that may have been spent performing procedures. This includes but not limited to vital sign monitoring, telemetry monitoring, continuous pulse oximety, IV medication, clinical response to the IV medications, documentation time , consultation time, interpretation of lab data, review of nursing notes and old record review. Temitope Kimbrough M.D., [facility].

## 2019-05-26 VITALS
SYSTOLIC BLOOD PRESSURE: 133 MMHG | DIASTOLIC BLOOD PRESSURE: 89 MMHG | BODY MASS INDEX: 35.82 KG/M2 | RESPIRATION RATE: 18 BRPM | TEMPERATURE: 97.3 F | OXYGEN SATURATION: 99 % | WEIGHT: 279.1 LBS | HEIGHT: 74 IN | HEART RATE: 97 BPM

## 2019-05-26 LAB
ALBUMIN SERPL-MCNC: 3.2 G/DL (ref 3.4–5)
ALP BLD-CCNC: 57 U/L (ref 40–129)
ALT SERPL-CCNC: 12 U/L (ref 10–40)
ANION GAP SERPL CALCULATED.3IONS-SCNC: 9 MMOL/L (ref 3–16)
AST SERPL-CCNC: 59 U/L (ref 15–37)
BILIRUB SERPL-MCNC: 0.3 MG/DL (ref 0–1)
BILIRUBIN DIRECT: <0.2 MG/DL (ref 0–0.3)
BILIRUBIN, INDIRECT: ABNORMAL MG/DL (ref 0–1)
BUN BLDV-MCNC: 7 MG/DL (ref 7–20)
CALCIUM SERPL-MCNC: 8.6 MG/DL (ref 8.3–10.6)
CHLORIDE BLD-SCNC: 108 MMOL/L (ref 99–110)
CO2: 22 MMOL/L (ref 21–32)
CREAT SERPL-MCNC: 0.6 MG/DL (ref 0.9–1.3)
EKG ATRIAL RATE: 89 BPM
EKG DIAGNOSIS: NORMAL
EKG P AXIS: 51 DEGREES
EKG P-R INTERVAL: 150 MS
EKG Q-T INTERVAL: 352 MS
EKG QRS DURATION: 86 MS
EKG QTC CALCULATION (BAZETT): 428 MS
EKG R AXIS: 69 DEGREES
EKG T AXIS: 39 DEGREES
EKG VENTRICULAR RATE: 89 BPM
ESTIMATED AVERAGE GLUCOSE: 125.5 MG/DL
GFR AFRICAN AMERICAN: >60
GFR NON-AFRICAN AMERICAN: >60
GLUCOSE BLD-MCNC: 98 MG/DL (ref 70–99)
HBA1C MFR BLD: 6 %
LV EF: 55 %
LVEF MODALITY: NORMAL
MAGNESIUM: 2 MG/DL (ref 1.8–2.4)
POTASSIUM SERPL-SCNC: 3.9 MMOL/L (ref 3.5–5.1)
SODIUM BLD-SCNC: 139 MMOL/L (ref 136–145)
TOTAL PROTEIN: 6.2 G/DL (ref 6.4–8.2)

## 2019-05-26 PROCEDURE — 83735 ASSAY OF MAGNESIUM: CPT

## 2019-05-26 PROCEDURE — 6370000000 HC RX 637 (ALT 250 FOR IP): Performed by: INTERNAL MEDICINE

## 2019-05-26 PROCEDURE — 93010 ELECTROCARDIOGRAM REPORT: CPT | Performed by: INTERNAL MEDICINE

## 2019-05-26 PROCEDURE — 93005 ELECTROCARDIOGRAM TRACING: CPT | Performed by: INTERNAL MEDICINE

## 2019-05-26 PROCEDURE — 36415 COLL VENOUS BLD VENIPUNCTURE: CPT

## 2019-05-26 PROCEDURE — 6360000004 HC RX CONTRAST MEDICATION: Performed by: INTERNAL MEDICINE

## 2019-05-26 PROCEDURE — 2580000003 HC RX 258: Performed by: INTERNAL MEDICINE

## 2019-05-26 PROCEDURE — 80076 HEPATIC FUNCTION PANEL: CPT

## 2019-05-26 PROCEDURE — C8929 TTE W OR WO FOL WCON,DOPPLER: HCPCS

## 2019-05-26 PROCEDURE — 99233 SBSQ HOSP IP/OBS HIGH 50: CPT | Performed by: INTERNAL MEDICINE

## 2019-05-26 PROCEDURE — 80048 BASIC METABOLIC PNL TOTAL CA: CPT

## 2019-05-26 RX ORDER — ATORVASTATIN CALCIUM 80 MG/1
80 TABLET, FILM COATED ORAL NIGHTLY
Qty: 30 TABLET | Refills: 3 | Status: SHIPPED | OUTPATIENT
Start: 2019-05-26

## 2019-05-26 RX ORDER — CARVEDILOL 3.12 MG/1
3.12 TABLET ORAL 2 TIMES DAILY WITH MEALS
Qty: 60 TABLET | Refills: 3 | Status: SHIPPED | OUTPATIENT
Start: 2019-05-26 | End: 2019-05-26 | Stop reason: HOSPADM

## 2019-05-26 RX ORDER — ASPIRIN 81 MG/1
81 TABLET, CHEWABLE ORAL DAILY
Qty: 30 TABLET | Refills: 3 | Status: SHIPPED | OUTPATIENT
Start: 2019-05-27

## 2019-05-26 RX ORDER — LISINOPRIL 2.5 MG/1
2.5 TABLET ORAL DAILY
Qty: 30 TABLET | Refills: 3 | Status: SHIPPED | OUTPATIENT
Start: 2019-05-27 | End: 2019-05-26 | Stop reason: HOSPADM

## 2019-05-26 RX ORDER — CARVEDILOL 6.25 MG/1
6.25 TABLET ORAL 2 TIMES DAILY WITH MEALS
Status: DISCONTINUED | OUTPATIENT
Start: 2019-05-26 | End: 2019-05-26 | Stop reason: HOSPADM

## 2019-05-26 RX ORDER — LISINOPRIL 5 MG/1
5 TABLET ORAL DAILY
Status: DISCONTINUED | OUTPATIENT
Start: 2019-05-27 | End: 2019-05-26 | Stop reason: HOSPADM

## 2019-05-26 RX ORDER — CARVEDILOL 6.25 MG/1
6.25 TABLET ORAL 2 TIMES DAILY WITH MEALS
Qty: 60 TABLET | Refills: 3 | Status: SHIPPED | OUTPATIENT
Start: 2019-05-26 | End: 2022-04-20

## 2019-05-26 RX ORDER — LISINOPRIL 5 MG/1
5 TABLET ORAL DAILY
Qty: 30 TABLET | Refills: 3 | Status: SHIPPED | OUTPATIENT
Start: 2019-05-27 | End: 2022-04-20

## 2019-05-26 RX ADMIN — Medication 1 TABLET: at 08:42

## 2019-05-26 RX ADMIN — ASPIRIN 81 MG 81 MG: 81 TABLET ORAL at 08:42

## 2019-05-26 RX ADMIN — OXYCODONE HYDROCHLORIDE AND ACETAMINOPHEN 1 TABLET: 5; 325 TABLET ORAL at 01:11

## 2019-05-26 RX ADMIN — CARVEDILOL 3.12 MG: 3.12 TABLET, FILM COATED ORAL at 08:42

## 2019-05-26 RX ADMIN — GABAPENTIN 300 MG: 300 CAPSULE ORAL at 08:42

## 2019-05-26 RX ADMIN — TICAGRELOR 90 MG: 90 TABLET ORAL at 08:42

## 2019-05-26 RX ADMIN — LISINOPRIL 2.5 MG: 2.5 TABLET ORAL at 08:42

## 2019-05-26 RX ADMIN — Medication 10 ML: at 08:44

## 2019-05-26 RX ADMIN — PERFLUTREN 1.65 MG: 6.52 INJECTION, SUSPENSION INTRAVENOUS at 09:16

## 2019-05-26 ASSESSMENT — PAIN DESCRIPTION - ORIENTATION: ORIENTATION: LOWER

## 2019-05-26 ASSESSMENT — PAIN SCALES - GENERAL
PAINLEVEL_OUTOF10: 8
PAINLEVEL_OUTOF10: 3

## 2019-05-26 ASSESSMENT — PAIN DESCRIPTION - LOCATION: LOCATION: BACK

## 2019-05-26 NOTE — DISCHARGE SUMMARY
Hospital Medicine Discharge Summary    Patient ID: Dolores Velazco      Patient's PCP: PHANI Schmitt CNP    Admit Date: 5/25/2019     Discharge Date:  05/26/19    Admitting Physician: Sandor Torres MD     Discharge Physician: Selwyn Menchaca MD     Discharge Diagnoses: Active Hospital Problems    Diagnosis    Acute transmural inferior wall MI ([facility] [address] [I21.19]    Chronic alcoholism ([facility] [address] [F10.20]    Tobacco abuse [Z72.0]    Obesity [E66.9]    CAD (coronary artery disease) [I25.10]    HTN (hypertension) [I10]       The patient was seen and examined on day of discharge and this discharge summary is in conjunction with any daily progress note from day of discharge. HPI taken from admission H&P : 50 y.o. male who presented to [facility] with CP . location substernal, radiating to the left arm, started 45 minutes prior to arrival, quality heavy pressure sensation, associated with mild nausea and shortness of breath, no aggravating or relieving factors. Found to have ST elevation in the inferior leads, taken emergently to the Cath Lab and found to have stenosis in the RCA stent. Patient had PCI of the RCA back in 2012 by Dr. Mcgrath Crittenden Course:  By Problem List   Acute inferior wall MI  S/p LHC by   100% prox RCA In Stent Thrombosis - Rx with Penumbra Aspiration with large thrombus extracted and reestablished distal perfusion.  70% residual post aspiration lesion stented with 4.0 X 12 Jacinta to 16 TRINI near ostium   - LV gram ef 40%  - on DAPT, statin, BB, ACE-I  - ECHO reviewed   - cardiac rehab  - FLP, A1C - reviewed      CAD  S/p RCA stent in 2012  Patient was not taking the any meds, not even asa, poor compliance  Counseled adherence to medication regimen  Started on guideline meds     Hypotension during cath - treated with touch of vinayak and dopamine in cath Lab,  weaned  as tolerated     Tobacco abuse-counseled cessation     Chronic alcoholism-counseled cessation     Obesity-counseled weight loss, contributing to current presentation    Patient very eager to be discharged . He got discharged home in stable medical condition     Physical Exam Performed:   /89   Pulse 91   Temp 97.3 °F (36.3 °C) (Oral)   Resp 18   Ht 6' 2\" (1.88 m)   Wt 279 lb 1.6 oz (126.6 kg)   SpO2 99%   BMI 35.83 kg/m²       General appearance:  No apparent distress, appears stated age and cooperative. HEENT:  Normal cephalic, atraumatic without obvious deformity. Pupils equal, round, and reactive to light. Extra ocular muscles intact. Conjunctivae/corneas clear. Neck: Supple, with full range of motion. No jugular venous distention. Trachea midline. Respiratory:  Normal respiratory effort. Clear to auscultation, bilaterally without Rales/Wheezes/Rhonchi. Cardiovascular:  Regular rate and rhythm with normal S1/S2 without murmurs, rubs or gallops. Abdomen: Soft, non-tender, non-distended with normal bowel sounds. Musculoskeletal:  No clubbing, cyanosis or edema bilaterally. Full range of motion without deformity. Skin: Skin color, texture, turgor normal.  No rashes or lesions. Neurologic:  Neurovascularly intact without any focal sensory/motor deficits. Cranial nerves: II-XII intact, grossly non-focal.  Psychiatric:  Alert and oriented, thought content appropriate, normal insight  Capillary Refill: Brisk,< 3 seconds   Peripheral Pulses: +2 palpable, equal bilaterally       Labs:  For convenience and continuity at follow-up the following most recent labs are provided:      CBC:    Lab Results   Component Value Date    WBC 10.1 05/25/2019    HGB 15.1 05/25/2019    HCT 44.3 05/25/2019     05/25/2019       Renal:    Lab Results   Component Value Date     05/26/2019    K 3.9 05/26/2019    K 4.3 05/25/2019     05/26/2019    CO2 22 05/26/2019    BUN 7 05/26/2019    CREATININE 0.6 05/26/2019    CALCIUM 8.6 05/26/2019    PHOS 4.3 07/25/2012     Significant Diagnostic

## 2019-05-26 NOTE — PROGRESS NOTES
D/c orders in place as long as cardiology okay to dc pt. Spoke to Dr. Micha Nails who states pt was okay to go and he just needed to modify med orders.

## 2019-05-26 NOTE — PLAN OF CARE
Problem: Falls - Risk of  Goal: Absence of falls  Outcome: Ongoing  Pt remains a fall risk. See Glorianne Quita Fall Score. Fall risk bundle in place. Pt bed is in low position with bed alarm on, side rails up, fall risk bracelet and non-skid footwear in use. Will continue to monitor and reassess barnes fall risk. Problem: Pain:  Goal: Control of acute pain  Outcome: Ongoing  Pt able to respond appropriately to pain assessments; 0-10 scale being used. See doc flow for pain assessment details.  Will cont to monitor.

## 2019-05-26 NOTE — PROGRESS NOTES
EXTREMITIES: No lower extremity edema. Motor movement grossly intact. No cyanosis or clubbing. Current Inpatient Medications:   gabapentin  300 mg Oral TID    therapeutic multivitamin-minerals  1 tablet Oral Daily    sodium chloride flush  10 mL Intravenous 2 times per day    carvedilol  3.125 mg Oral BID WC    lisinopril  2.5 mg Oral Daily    atorvastatin  80 mg Oral Nightly    ticagrelor  90 mg Oral BID    aspirin  81 mg Oral Daily      sodium chloride Stopped (05/26/19 0630)       Diagnostics:  reviewed  Cardiac Angiography:    Labs:   CBC:   Recent Labs     05/25/19 0255   WBC 10.1   RBC 4.70   HGB 15.1   HCT 44.3   MCV 94.1   RDW 14.0        BMP:  Recent Labs     05/25/19 0255 05/26/19  0411    139   K 4.3 3.9    108   CO2 21 22   BUN 10 7   CREATININE 0.8* 0.6*     BNP: No results for input(s): BNP in the last 72 hours. PT/INR:   Recent Labs     05/25/19 0255 05/26/19 0411   PROT 7.1 6.2*   INR 1.09  --      APTT:  Recent Labs     05/25/19 0255 05/25/19  0837   APTT 27.6 28.4     CARDIAC ENZYMES:  Recent Labs     05/25/19 0255   TROPONINI <0.01     FASTING LIPID PANEL:  Lab Results   Component Value Date    CHOL 217 05/25/2019    HDL 22 05/25/2019    TRIG 296 05/25/2019     LIVER PROFILE:  Recent Labs     05/25/19 0255 05/26/19  0411   AST 18 59*   ALT 9* 12   BILIDIR  --  <0.2   BILITOT <0.2 0.3   ALKPHOS 68 62       ASSESSMENT AND PLAN:    Patient Active Problem List   Diagnosis    [facility] inferior [facility]    CAD (coronary artery disease)    Status post angioplasty with stent    HTN (hypertension)    Trochanteric bursitis    Mediastinal lymphadenopathy    Dyspnea    Tobacco abuse    Daytime somnolence    Obesity    Snoring    Acute transmural inferior wall MI (HCC)    Chronic alcoholism ([facility] [address]       1. Essential hypertension   -increase Coreg to 6.25 mg bid   -increase lisinopril to 5 mg daily    2.  ST elevation MI   -DAPT   -statin    3. Smoking   -Risks of smoking reviewed and cessation options discussed     4. Obesity   -The patient is asked to make an attempt to improve diet and exercise patterns to aid in medical management of this problem. Ok to discharge from cardiac standpoint     Please see orders. Discussed with patient and nursing. Thank you for asking me to assist in the care of this patient. Please contact me if you have any questions regarding her evaluation. All questions and concerns were addressed to the patient/family. Alternatives to my treatment were discussed. The note was completed using EMR. Every effortwas made to ensure accuracy; however, inadvertent computerized transcription errors may be present. BRENDA Marrufo F.A.C.C.   [facility]  ([address] office

## 2019-05-27 LAB — EKG DIAGNOSIS: NORMAL

## 2019-05-27 PROCEDURE — 93010 ELECTROCARDIOGRAM REPORT: CPT | Performed by: INTERNAL MEDICINE

## 2019-05-28 ENCOUNTER — HOSPITAL ENCOUNTER (OUTPATIENT)
Dept: CARDIAC CATH/INVASIVE PROCEDURES | Age: 49
Discharge: HOME | End: 2019-05-28
Attending: SURGERY | Admitting: SURGERY
Payer: COMMERCIAL

## 2019-05-28 VITALS — WEIGHT: 279 LBS | HEIGHT: 74 IN | BODY MASS INDEX: 35.81 KG/M2

## 2019-05-28 LAB
ANION GAP SERPL CALCULATED.3IONS-SCNC: 10 MMOL/L (ref 3–16)
BUN BLDV-MCNC: 8 MG/DL (ref 7–20)
CALCIUM SERPL-MCNC: 9.4 MG/DL (ref 8.3–10.6)
CHLORIDE BLD-SCNC: 102 MMOL/L (ref 99–110)
CO2: 25 MMOL/L (ref 21–32)
CREAT SERPL-MCNC: 0.7 MG/DL (ref 0.9–1.3)
GFR AFRICAN AMERICAN: >60
GFR NON-AFRICAN AMERICAN: >60
GLUCOSE BLD-MCNC: 104 MG/DL (ref 70–99)
HCT VFR BLD CALC: 40 % (ref 40.5–52.5)
HEMOGLOBIN: 13.7 G/DL (ref 13.5–17.5)
INR BLD: 1.18 (ref 0.86–1.14)
MCH RBC QN AUTO: 31.9 PG (ref 26–34)
MCHC RBC AUTO-ENTMCNC: 34.3 G/DL (ref 31–36)
MCV RBC AUTO: 93.2 FL (ref 80–100)
PDW BLD-RTO: 13.7 % (ref 12.4–15.4)
PLATELET # BLD: 235 K/UL (ref 135–450)
PMV BLD AUTO: 7.8 FL (ref 5–10.5)
POC ACT LR: 151 SEC
POC ACT LR: 248 SEC
POC ACT LR: >400 SEC
POTASSIUM SERPL-SCNC: 4.1 MMOL/L (ref 3.5–5.1)
PROTHROMBIN TIME: 13.4 SEC (ref 9.8–13)
RBC # BLD: 4.29 M/UL (ref 4.2–5.9)
SODIUM BLD-SCNC: 137 MMOL/L (ref 136–145)
WBC # BLD: 10.3 K/UL (ref 4–11)

## 2019-05-28 PROCEDURE — 2709999900 HC NON-CHARGEABLE SUPPLY

## 2019-05-28 PROCEDURE — 37221 PR REVSC OPN/PRQ ILIAC ART W/STNT PLMT & ANGIOPLSTY: CPT | Performed by: SURGERY

## 2019-05-28 PROCEDURE — 6360000002 HC RX W HCPCS

## 2019-05-28 PROCEDURE — 85610 PROTHROMBIN TIME: CPT

## 2019-05-28 PROCEDURE — 99152 MOD SED SAME PHYS/QHP 5/>YRS: CPT

## 2019-05-28 PROCEDURE — C1876 STENT, NON-COA/NON-COV W/DEL: HCPCS

## 2019-05-28 PROCEDURE — 6370000000 HC RX 637 (ALT 250 FOR IP)

## 2019-05-28 PROCEDURE — 75716 ARTERY X-RAYS ARMS/LEGS: CPT

## 2019-05-28 PROCEDURE — 2580000003 HC RX 258

## 2019-05-28 PROCEDURE — C1725 CATH, TRANSLUMIN NON-LASER: HCPCS

## 2019-05-28 PROCEDURE — C1769 GUIDE WIRE: HCPCS

## 2019-05-28 PROCEDURE — 99153 MOD SED SAME PHYS/QHP EA: CPT

## 2019-05-28 PROCEDURE — 6360000004 HC RX CONTRAST MEDICATION

## 2019-05-28 PROCEDURE — C1894 INTRO/SHEATH, NON-LASER: HCPCS

## 2019-05-28 PROCEDURE — 75716 ARTERY X-RAYS ARMS/LEGS: CPT | Performed by: SURGERY

## 2019-05-28 PROCEDURE — 85027 COMPLETE CBC AUTOMATED: CPT

## 2019-05-28 PROCEDURE — C1887 CATHETER, GUIDING: HCPCS

## 2019-05-28 PROCEDURE — 80048 BASIC METABOLIC PNL TOTAL CA: CPT

## 2019-05-28 PROCEDURE — 75625 CONTRAST EXAM ABDOMINL AORTA: CPT | Performed by: SURGERY

## 2019-05-28 PROCEDURE — C1760 CLOSURE DEV, VASC: HCPCS

## 2019-05-28 PROCEDURE — 75625 CONTRAST EXAM ABDOMINL AORTA: CPT

## 2019-05-28 PROCEDURE — 37221 HC ILIAC TERRITORY PLASTY STENT: CPT

## 2019-05-28 RX ORDER — FENTANYL CITRATE 50 UG/ML
INJECTION, SOLUTION INTRAMUSCULAR; INTRAVENOUS
Status: COMPLETED | OUTPATIENT
Start: 2019-05-28 | End: 2019-05-28

## 2019-05-28 RX ORDER — SODIUM CHLORIDE 9 MG/ML
1000 INJECTION, SOLUTION INTRAVENOUS CONTINUOUS
Status: DISCONTINUED | OUTPATIENT
Start: 2019-05-28 | End: 2019-05-28 | Stop reason: HOSPADM

## 2019-05-28 RX ORDER — HEPARIN SODIUM 1000 [USP'U]/ML
INJECTION, SOLUTION INTRAVENOUS; SUBCUTANEOUS
Status: COMPLETED | OUTPATIENT
Start: 2019-05-28 | End: 2019-05-28

## 2019-05-28 RX ORDER — ASPIRIN 325 MG
325 TABLET ORAL ONCE
Status: DISCONTINUED | OUTPATIENT
Start: 2019-05-28 | End: 2019-05-28 | Stop reason: ALTCHOICE

## 2019-05-28 RX ORDER — MIDAZOLAM HYDROCHLORIDE 5 MG/ML
INJECTION INTRAMUSCULAR; INTRAVENOUS
Status: COMPLETED | OUTPATIENT
Start: 2019-05-28 | End: 2019-05-28

## 2019-05-28 RX ADMIN — FENTANYL CITRATE 50 MCG: 50 INJECTION, SOLUTION INTRAMUSCULAR; INTRAVENOUS at 07:53

## 2019-05-28 RX ADMIN — FENTANYL CITRATE 50 MCG: 50 INJECTION, SOLUTION INTRAMUSCULAR; INTRAVENOUS at 08:01

## 2019-05-28 RX ADMIN — MIDAZOLAM HYDROCHLORIDE 1 MG: 5 INJECTION INTRAMUSCULAR; INTRAVENOUS at 07:42

## 2019-05-28 RX ADMIN — FENTANYL CITRATE 50 MCG: 50 INJECTION, SOLUTION INTRAMUSCULAR; INTRAVENOUS at 08:47

## 2019-05-28 RX ADMIN — FENTANYL CITRATE 50 MCG: 50 INJECTION, SOLUTION INTRAMUSCULAR; INTRAVENOUS at 08:44

## 2019-05-28 RX ADMIN — Medication 81 MG: at 06:39

## 2019-05-28 RX ADMIN — HEPARIN SODIUM 5000 UNITS: 1000 INJECTION, SOLUTION INTRAVENOUS; SUBCUTANEOUS at 08:11

## 2019-05-28 RX ADMIN — SODIUM CHLORIDE 1000 ML: 9 INJECTION, SOLUTION INTRAVENOUS at 06:39

## 2019-05-28 RX ADMIN — FENTANYL CITRATE 50 MCG: 50 INJECTION, SOLUTION INTRAMUSCULAR; INTRAVENOUS at 08:18

## 2019-05-28 RX ADMIN — FENTANYL CITRATE 50 MCG: 50 INJECTION, SOLUTION INTRAMUSCULAR; INTRAVENOUS at 07:42

## 2019-05-28 RX ADMIN — MIDAZOLAM HYDROCHLORIDE 1 MG: 5 INJECTION INTRAMUSCULAR; INTRAVENOUS at 08:02

## 2019-05-28 NOTE — OP NOTE
[address] [address]                                OPERATIVE REPORT    PATIENT NAME: Roberto Gomez                     :        1970  MED REC NO:   [id number]                          ROOM:  ACCOUNT NO:   [de-identified]                           ADMIT DATE: 2019  PROVIDER:     Drew Dillon MD    DATE OF PROCEDURE:  2019    PREOPERATIVE DIAGNOSIS:  Ischemic rest pain of the right lower  extremity. POSTOPERATIVE DIAGNOSIS:  1. Ischemic rest pain of the right lower extremity secondary to right  common iliac artery occlusion. 2.  Left iliac artery stenosis. SURGEON:  Drew Dillon MD    PROCEDURES PERFORMED:  1. Ultrasound-guided bilateral femoral artery access. 2.  Insertion of catheters into the aorta via bilateral femoral  approaches. 3.  Angioplasty and stenting in bilateral common iliac arteries. 4.  Aortogram with bilateral lower extremity angiograms. ANESTHESIA:  Local with monitored IV sedation for 63 minutes. INDICATIONS:  The patient is a 72-year-old male with ischemic rest pain  of the right lower extremity, claudication symptoms of the left. Noninvasive studies suggested inflow disease. The patient is brought to  the angio suite at this time to undergo angiography with possible  intervention. PROCEDURE:  The patient was brought to the angio suite, placed in supine  position. Monitors placed, including blood pressure, EKG and pulse  oximetry. The patient was monitored by dedicated monitoring nurse  throughout the entire procedure. The patient was sedated with  incremental doses of intravenous Versed and fentanyl. Bilateral groins  were prepped and draped in sterile fashion. Under ultrasound guidance,  the left common femoral artery was accessed and a 5-Uzbek sheath was  placed in a retrograde fashion. Bentson wire was advanced into the  abdominal aorta.   Modified hook catheter was advanced over the wire. Abdominal aortogram was then obtained. The catheter was pulled on just  above the aortic bifurcation. Bilateral iliofemoral angiograms were  obtained. Under ultrasound guidance, the right common femoral artery  was next accessed and a 6-Salvadorean introducer sheath was placed. The  patient was given 5000 units of intravenous heparin. Using angled glide  catheter and Bentson wire, I was able to cross the occlusion of the  common iliac artery with the catheter in the aorta, injection was  performed confirming intraluminal placement. The 5-Salvadorean sheath in the  left femoral position was then removed over the Bentson wire and  replaced with a 6-Salvadorean introducer sheath. Bilateral balloon  angioplasty was performed of the common iliac arteries using a 5 x 40  balloon on the right and a 6 x 20 balloon on the left. Two 7-mm stents  were then deployed across the aortic bifurcation into the iliac arteries  using a 7 x 57 stent on the right and a 7 x 27 on the left. Post-stent,  angiograms showed some residual disease just above the stent on the  right and a residual disease with some narrowing distal to the stent on  the left. Therefore, an additional 7 x 27 stent was deployed above the  extending the right proximal iliac stent and additional 7 x 27 stent was  deployed at the distal aspect of the left iliac stent. Repeat aortogram  was obtained showing brisk flow through the stents. Bilateral lower  extremity angiograms were performed showing widely patent common  profunda, superficial femoral arteries. There is mild disease in the  right common femoral artery and a mild stenosis in the distal left  superficial femoral artery. Bilateral above and below knee popliteal  arteries are patent with three-vessel runoff below the knee bilaterally.   The bilateral retrograde femoral angiograms were then performed and  bilateral 6-Salvadorean Mynx closure devices were placed in standard fashion  achieving excellent hemostasis. The patient was then transferred to the  recovery area in stable condition. Angiographic findings were as noted  throughout the body of the report with additional findings of the  abdominal aorta showing bilateral patent renal arteries without  stenosis. There is occlusion of the right common iliac artery just  beyond its origin with reconstitution just above the iliac bifurcation. There is mild disease in the right common femoral artery. There is a  mild stenosis in the mid left common iliac artery which has been  corrected with stenting. There is approximately 50% stenosis just below  the adductor canal of the left superficial femoral artery.         Violet Arguello MD    D: 05/28/2019 9:00:43       T: 05/28/2019 9:05:42     [id number]  Job#: [id number]     Doc#: [id number]    CC:

## 2019-05-28 NOTE — H&P
History and Physical / Pre-Sedation Assessment    Patient:  Anisha Steven  :   1970    Intended Procedure: Aortogram with Lower extremity angiogram, possible intervnetion      HPI: Ischemic rest pain RLE - See office note . Recent Cardiac Cath and PCI  Nurses notes reviewed and agreed. Medications reviewed  Allergies:   Codeine; Flexeril [cyclobenzaprine hcl]; and Vicodin [hydrocodone-acetaminophen]        Physical Exam:   CURRENT VITALS:  Ht 6' 2\" (1.88 m)   Wt 279 lb (126.6 kg)   BMI 35.82 kg/m²   Airway:Normal  Cardiac:Normal  Pulmonary:Normal  Abdomen:Normal        Pre-Procedure Assessment/Plan:  ASA 2 - Patient with mild systemic disease with no functional limitations    Mallampati Airway Assessment:  Mallampati Class III - (soft palate & base of uvula are visible)    Level of Sedation Plan: Moderate sedation    Post Procedure plan: Return to same level of care    I assessed the patient and find that the patient is in satisfactory condition to proceed with the planned procedure and sedation plan. I have explained the risk, benefits, and alternatives to the procedure. The patient understands and agrees to proceed.   Yes    [place] Click

## 2019-05-29 LAB — POC ACT LR: 125 SEC

## 2019-12-03 ENCOUNTER — HOSPITAL ENCOUNTER (EMERGENCY)
Age: 49
Discharge: HOME | End: 2019-12-03
Payer: COMMERCIAL

## 2019-12-03 VITALS
TEMPERATURE: 98 F | SYSTOLIC BLOOD PRESSURE: 131 MMHG | RESPIRATION RATE: 18 BRPM | BODY MASS INDEX: 34.67 KG/M2 | DIASTOLIC BLOOD PRESSURE: 78 MMHG | OXYGEN SATURATION: 99 % | HEART RATE: 96 BPM | WEIGHT: 270 LBS

## 2019-12-03 DIAGNOSIS — B37.2 INTERTRIGINOUS CANDIDIASIS: ICD-10-CM

## 2019-12-03 DIAGNOSIS — L03.317 CELLULITIS OF BUTTOCK: Primary | ICD-10-CM

## 2019-12-03 PROCEDURE — 99283 EMERGENCY DEPT VISIT LOW MDM: CPT

## 2019-12-03 PROCEDURE — 87070 CULTURE OTHR SPECIMN AEROBIC: CPT

## 2019-12-03 PROCEDURE — 87205 SMEAR GRAM STAIN: CPT

## 2019-12-03 PROCEDURE — 2500000003 HC RX 250 WO HCPCS: Performed by: PHYSICIAN ASSISTANT

## 2019-12-03 RX ORDER — NYSTATIN 10B UNIT
1 POWDER (EA) MISCELLANEOUS 2 TIMES DAILY
Qty: 1 EACH | Refills: 0 | Status: SHIPPED | OUTPATIENT
Start: 2019-12-03 | End: 2019-12-17

## 2019-12-03 RX ORDER — CEPHALEXIN 500 MG/1
500 CAPSULE ORAL 4 TIMES DAILY
Qty: 40 CAPSULE | Refills: 0 | Status: SHIPPED | OUTPATIENT
Start: 2019-12-03 | End: 2019-12-13

## 2019-12-03 RX ADMIN — MICONAZOLE NITRATE: 20 POWDER TOPICAL at 12:01

## 2019-12-03 ASSESSMENT — PAIN SCALES - GENERAL: PAINLEVEL_OUTOF10: 10

## 2019-12-05 LAB
GRAM STAIN RESULT: ABNORMAL
ORGANISM: ABNORMAL
WOUND/ABSCESS: ABNORMAL

## 2019-12-05 ASSESSMENT — ENCOUNTER SYMPTOMS
SHORTNESS OF BREATH: 0
CONSTIPATION: 0
VOMITING: 0
RECTAL PAIN: 0
ANAL BLEEDING: 0
NAUSEA: 0
ABDOMINAL DISTENTION: 0
COUGH: 0
ABDOMINAL PAIN: 0
DIARRHEA: 0

## 2019-12-30 ENCOUNTER — HOSPITAL ENCOUNTER (OUTPATIENT)
Dept: NEUROLOGY | Age: 49
Discharge: HOME | End: 2019-12-30
Payer: COMMERCIAL

## 2019-12-30 PROCEDURE — 95909 NRV CNDJ TST 5-6 STUDIES: CPT

## 2019-12-30 PROCEDURE — 95886 MUSC TEST DONE W/N TEST COMP: CPT

## 2020-01-30 ENCOUNTER — HOSPITAL ENCOUNTER (OUTPATIENT)
Dept: GENERAL RADIOLOGY | Age: 50
Discharge: HOME | End: 2020-01-30
Payer: COMMERCIAL

## 2020-01-30 ENCOUNTER — HOSPITAL ENCOUNTER (OUTPATIENT)
Age: 50
Discharge: HOME | End: 2020-01-30
Payer: COMMERCIAL

## 2020-01-30 PROCEDURE — 72100 X-RAY EXAM L-S SPINE 2/3 VWS: CPT

## 2020-08-21 ENCOUNTER — HOSPITAL ENCOUNTER (OUTPATIENT)
Dept: ULTRASOUND IMAGING | Age: 50
Discharge: HOME | End: 2020-08-21
Payer: COMMERCIAL

## 2020-08-21 PROCEDURE — 76700 US EXAM ABDOM COMPLETE: CPT

## 2020-09-10 ENCOUNTER — TELEPHONE (OUTPATIENT)
Dept: GASTROENTEROLOGY | Age: 50
End: 2020-09-10

## 2020-09-10 ENCOUNTER — HOSPITAL ENCOUNTER (OUTPATIENT)
Dept: CT IMAGING | Age: 50
Discharge: HOME | End: 2020-09-10
Payer: COMMERCIAL

## 2020-09-10 ENCOUNTER — HOSPITAL ENCOUNTER (OUTPATIENT)
Age: 50
Discharge: HOME | End: 2020-09-10
Payer: COMMERCIAL

## 2020-09-10 ENCOUNTER — INITIAL CONSULT (OUTPATIENT)
Dept: GASTROENTEROLOGY | Age: 50
End: 2020-09-10
Payer: COMMERCIAL

## 2020-09-10 VITALS
SYSTOLIC BLOOD PRESSURE: 138 MMHG | WEIGHT: 303 LBS | BODY MASS INDEX: 38.89 KG/M2 | TEMPERATURE: 97.1 F | DIASTOLIC BLOOD PRESSURE: 80 MMHG | HEIGHT: 74 IN

## 2020-09-10 LAB
BUN BLDV-MCNC: 6 MG/DL (ref 7–20)
CREAT SERPL-MCNC: 0.7 MG/DL (ref 0.9–1.3)
GFR AFRICAN AMERICAN: >60
GFR NON-AFRICAN AMERICAN: >60

## 2020-09-10 PROCEDURE — 74177 CT ABD & PELVIS W/CONTRAST: CPT

## 2020-09-10 PROCEDURE — 82565 ASSAY OF CREATININE: CPT

## 2020-09-10 PROCEDURE — 3017F COLORECTAL CA SCREEN DOC REV: CPT | Performed by: INTERNAL MEDICINE

## 2020-09-10 PROCEDURE — 6360000004 HC RX CONTRAST MEDICATION: Performed by: INTERNAL MEDICINE

## 2020-09-10 PROCEDURE — 99204 OFFICE O/P NEW MOD 45 MIN: CPT | Performed by: INTERNAL MEDICINE

## 2020-09-10 PROCEDURE — 84520 ASSAY OF UREA NITROGEN: CPT

## 2020-09-10 PROCEDURE — 4004F PT TOBACCO SCREEN RCVD TLK: CPT | Performed by: INTERNAL MEDICINE

## 2020-09-10 PROCEDURE — 36415 COLL VENOUS BLD VENIPUNCTURE: CPT

## 2020-09-10 PROCEDURE — G8427 DOCREV CUR MEDS BY ELIG CLIN: HCPCS | Performed by: INTERNAL MEDICINE

## 2020-09-10 PROCEDURE — G8417 CALC BMI ABV UP PARAM F/U: HCPCS | Performed by: INTERNAL MEDICINE

## 2020-09-10 RX ADMIN — IOHEXOL 50 ML: 240 INJECTION, SOLUTION INTRATHECAL; INTRAVASCULAR; INTRAVENOUS; ORAL at 13:42

## 2020-09-10 RX ADMIN — IOPAMIDOL 75 ML: 755 INJECTION, SOLUTION INTRAVENOUS at 13:42

## 2020-09-10 NOTE — PROGRESS NOTES
PLACEMENT  05/06/2012    LES[facility]    CORONARY ANGIOPLASTY WITH STENT PLACEMENT  2000's    x 2    CORONARY ANGIOPLASTY WITH STENT PLACEMENT  05/25/2019    LESLY: [place]- [facility]    OTHER SURGICAL HISTORY  05/28/2019    Bilateral Femerol Stenting and angioplasty with Dr. Kymberly Santos   (This list may include medications prescribed during this encounter as epic can not insert only the list prior to this encounter.)  Current Outpatient Rx   Medication Sig Dispense Refill    aspirin 81 MG chewable tablet Take 1 tablet by mouth daily 30 tablet 3    atorvastatin (LIPITOR) 80 MG tablet Take 1 tablet by mouth nightly 30 tablet 3    ticagrelor (BRILINTA) 90 MG TABS tablet Take 1 tablet by mouth 2 times daily 60 tablet 3    carvedilol (COREG) 6.25 MG tablet Take 1 tablet by mouth 2 times daily (with meals) 60 tablet 3    lisinopril (PRINIVIL;ZESTRIL) 5 MG tablet Take 1 tablet by mouth daily 30 tablet 3    Multiple Vitamins-Minerals (THERAPEUTIC MULTIVITAMIN-MINERALS) tablet Take 1 tablet by mouth daily      ondansetron (ZOFRAN ODT) 4 MG disintegrating tablet Take 1 tablet by mouth every 8 hours as needed for Nausea or Vomiting 15 tablet 0    gabapentin (NEURONTIN) 300 MG capsule Take 300 mg by mouth 3 times daily       ALLERGIES     Allergies   Allergen Reactions    Codeine     Flexeril [Cyclobenzaprine Hcl] Other (See Comments)     Puts him to sleep    Vicodin [Hydrocodone-Acetaminophen]      IMMUNIZATIONS     Immunization History   Administered Date(s) Administered    Pneumococcal Polysaccharide ([id number]) 05/07/2012     REVIEW OF SYSTEMS   See HPI for further details and pertinent postiives. Negative for the following:  Constitutional: Negative for weight change. Negative for appetite change and fatigue. HENT: Negative for nosebleeds, sore throat, mouth sores, and voice change. Respiratory: Negative for cough, choking and chest tightness.    Cardiovascular: Negative for chest pain   Gastrointestinal: See HPI  Musculoskeletal: Negative for arthralgias. Skin: Negative for pallor. Neurological: Negative for weakness and light-headedness. Hematological: Negative for adenopathy. Does not bruise/bleed easily. Psychiatric/Behavioral: Negative for suicidal ideas. PHYSICAL EXAM   VITAL SIGNS: /80 (Site: Right Upper Arm)   Temp 97.1 °F (36.2 °C)   Ht 6' 2\" (1.88 m)   Wt (!) 303 lb (137.4 kg)   BMI 38.90 kg/m²   Wt Readings from Last 3 Encounters:   09/10/20 (!) 303 lb (137.4 kg)   12/03/19 270 lb (122.5 kg)   05/28/19 279 lb (126.6 kg)     Constitutional: Well developed, Well nourished, No acute distress, Non-toxic appearance. HENT: Normocephalic, Atraumatic, Bilateral external ears normal, Oropharynx moist, No oral exudates, Nose normal.   Eyes: Conjunctiva normal, No discharge. Neck: Normal range of motion, No tenderness, Supple, No stridor. Lymphatic: No cervical, subclavian, or axillary lymphadenopathy. Cardiovascular: Normal heart rate, Normal rhythm, No murmurs, No rubs, No gallops. Thorax & Lungs: Normal breath sounds, No respiratory distress, No wheezing, No chest tenderness. No gynecomastia. Abdomen: scars consistent with stated surgeries, no hernias, no HSM, soft NTND   Rectal:  Deferred. Skin: Warm, Dry, No erythema, No rash. No bruising. No spider hemangiomas. Back: No tenderness, No CVA tenderness. Lower Extremities: Intact distal pulses, No edema, No tenderness, No cyanosis, No clubbing. Neurologic: Alert & oriented x 3, Normal motor function, Normal sensory function, No focal deficits noted. No asterixis.   RADIOLOGY/PROCEDURES       FINAL IMPRESSION     Orders Placed This Encounter   Procedures    CT ABDOMEN PELVIS W IV CONTRAST Additional Contrast? Oral     Standing Status:   Future     Standing Expiration Date:   10/10/2020     Order Specific Question:   Additional Contrast?     Answer:   Oral    Comprehensive Metabolic Panel     Standing Status:   Future     Standing Expiration Date:   9/10/2021    Protime-INR     Standing Status:   Future     Standing Expiration Date:   10/10/2020     Order Specific Question:   Daily Coumadin Dose? Answer:   none-cirrhosis    CBC Auto Differential     Standing Status:   Future     Standing Expiration Date:   9/10/2021     Mario Melvin was seen today for establish care. Diagnoses and all orders for this visit:    Weight gain  -     Cancel: CTA ABDOMEN PELVIS W CONTRAST; Future  -     Cancel: CT ABDOMEN PELVIS W IV CONTRAST Additional Contrast? Oral; Future  -     CT ABDOMEN PELVIS W IV CONTRAST Additional Contrast? Oral; Future    Generalized abdominal pain  -     Cancel: CTA ABDOMEN PELVIS W CONTRAST; Future  -     Cancel: CT ABDOMEN PELVIS W IV CONTRAST Additional Contrast? Oral; Future  -     CT ABDOMEN PELVIS W IV CONTRAST Additional Contrast? Oral; Future    Rectal bleeding  -     CBC Auto Differential; Future  -     Cancel: CT ABDOMEN PELVIS W IV CONTRAST Additional Contrast? Oral; Future  -     CT ABDOMEN PELVIS W IV CONTRAST Additional Contrast? Oral; Future    Fatty liver  -     Comprehensive Metabolic Panel; Future  -     Protime-INR; Future  -     CBC Auto Differential; Future  -     Cancel: CT ABDOMEN PELVIS W IV CONTRAST Additional Contrast? Oral; Future  -     CT ABDOMEN PELVIS W IV CONTRAST Additional Contrast? Oral; Future    LLQ pain  -     Cancel: CTA ABDOMEN PELVIS W CONTRAST; Future  -     Cancel: CT ABDOMEN PELVIS W IV CONTRAST Additional Contrast? Oral; Future  -     CT ABDOMEN PELVIS W IV CONTRAST Additional Contrast? Oral; Future    Will need colonoscopy if above is normal.  If cirrhosis and ascites will need paracentesis and EGD. ORDERED FUTURE/PENDING TESTS       FOLLOWUP   Return for after ordered tests.           Bryan Meek 9/10/20 9:48 AM EDT    CC:  Huey Fabry, APRN - CNP

## 2020-09-10 NOTE — LETTER
Anastasia Leventhal  [address] [place] [address] ,  [address] [facility]  Phone: [phone]   NGQ:[phone]  [address] [place] [address],  [address] [place] [address], [address] [place] [address]  Phone: [phone]   CBC:[phone]    09/10/20    Patient:Tae EDENF:[id number]  YOB: 1970  Date of Visit:9/10/20    Dear Dr. Sonia Olivia, APRN - CNP    Thank you for the request for consultation for Deepti Raines to me for the evaluation of   Chief Complaint   Patient presents with   [address]     NP- fatty liver, abd swelling   . Below are the relevant portions of my assessment and plan of care. FINAL DIAGNOSIS/Assessment   Diagnosis Orders   1. Weight gain  CTA ABDOMEN PELVIS W CONTRAST   2. Generalized abdominal pain  CTA ABDOMEN PELVIS W CONTRAST   3. Rectal bleeding  CBC Auto Differential   4. Fatty liver  Comprehensive Metabolic Panel    Protime-INR    CBC Auto Differential       VISIT ORDERS/Plan  Orders Placed This Encounter   Procedures    CTA ABDOMEN PELVIS W CONTRAST     Standing Status:   Future     Standing Expiration Date:   10/10/2020    Comprehensive Metabolic Panel     Standing Status:   Future     Standing Expiration Date:   9/10/2021    Protime-INR     Standing Status:   Future     Standing Expiration Date:   10/10/2020     Order Specific Question:   Daily Coumadin Dose? Answer:   none-cirrhosis    CBC Auto Differential     Standing Status:   Future     Standing Expiration Date:   9/10/2021       If you have questions, please do not hesitate to call me. I look forward to following Deepti Raines along with you.     Sincerely,        Danica Falcon 9/10/20 10:00 AM EDT

## 2020-09-11 RX ORDER — POLYETHYLENE GLYCOL 3350, SODIUM SULFATE ANHYDROUS, SODIUM BICARBONATE, SODIUM CHLORIDE, POTASSIUM CHLORIDE 236; 22.74; 6.74; 5.86; 2.97 G/4L; G/4L; G/4L; G/4L; G/4L
4 POWDER, FOR SOLUTION ORAL ONCE
Qty: 4000 ML | Refills: 0 | Status: SHIPPED | OUTPATIENT
Start: 2020-09-11 | End: 2020-09-11

## 2020-09-11 RX ORDER — BISACODYL 5 MG
TABLET, DELAYED RELEASE (ENTERIC COATED) ORAL
Qty: 4 TABLET | Refills: 0 | Status: SHIPPED | OUTPATIENT
Start: 2020-09-11 | End: 2022-04-20

## 2020-09-11 NOTE — RESULT ENCOUNTER NOTE
Fatty liver can be a cause for RUQ pain. Like any other form of chronic liver disease, it can cause problems including HERNANDEZ cirrhosis over a course of 20-30 years. It is associated with other future problems like htn, hypercholesterolemia, and diabetes. Weight loss/Healthy lifestyle and vitamin E 400IU twice a day are recommended. Non invasive assessment of fibrosis can be performed with a fibroscan. Blood work will not show this. Please offer fibroscan. Additionally annual follow up and re-assessment of fibrosis every 2-3 years is recommended. No ascites noted. Recommend fibroscan, egd and colonoscopy with mac. Split dose esau prep.

## 2020-10-02 NOTE — PROGRESS NOTES
PAT call placed to Pt. Pt. confirmed Fibroscan appointment scheduled on 10/5/2020 at 0900. Pt verbalized understanding of NPO after midnight. Denies any implanted devices with batteries. No further questions or concerns. Pt requested to keep Fibroscan and will reschedule EGD/COLON due to being sick and was not able to get COVID testing completed.

## 2020-10-05 ENCOUNTER — HOSPITAL ENCOUNTER (OUTPATIENT)
Dept: ENDOSCOPY | Age: 50
Discharge: HOME | End: 2020-10-05
Payer: COMMERCIAL

## 2020-10-05 ENCOUNTER — HOSPITAL ENCOUNTER (OUTPATIENT)
Dept: ENDOSCOPY | Age: 50
Discharge: HOME | End: 2020-10-05

## 2020-10-05 PROCEDURE — 91200 LIVER ELASTOGRAPHY: CPT

## 2020-10-05 PROCEDURE — 91200 LIVER ELASTOGRAPHY: CPT | Performed by: INTERNAL MEDICINE

## 2020-10-09 ENCOUNTER — TELEPHONE (OUTPATIENT)
Dept: GASTROENTEROLOGY | Age: 50
End: 2020-10-09

## 2020-10-09 RX ORDER — VITAMIN E 268 MG
400 CAPSULE ORAL 2 TIMES DAILY
Qty: 30 CAPSULE | Refills: 5 | Status: SHIPPED | OUTPATIENT
Start: 2020-10-09

## 2021-06-17 ENCOUNTER — HOSPITAL ENCOUNTER (OUTPATIENT)
Dept: ULTRASOUND IMAGING | Age: 51
Discharge: HOME | End: 2021-06-17
Payer: COMMERCIAL

## 2021-06-17 DIAGNOSIS — L98.9 PAINFUL SKIN LESION: ICD-10-CM

## 2021-06-17 PROCEDURE — 76999 ECHO EXAMINATION PROCEDURE: CPT

## 2021-09-10 ENCOUNTER — HOSPITAL ENCOUNTER (OUTPATIENT)
Dept: CT IMAGING | Age: 51
Discharge: HOME | End: 2021-09-10
Payer: COMMERCIAL

## 2021-09-10 DIAGNOSIS — R10.84 ABDOMINAL PAIN, GENERALIZED: ICD-10-CM

## 2021-09-10 PROCEDURE — 74177 CT ABD & PELVIS W/CONTRAST: CPT

## 2021-09-10 PROCEDURE — 6360000004 HC RX CONTRAST MEDICATION: Performed by: NURSE PRACTITIONER

## 2021-09-10 RX ADMIN — IOHEXOL 50 ML: 240 INJECTION, SOLUTION INTRATHECAL; INTRAVASCULAR; INTRAVENOUS; ORAL at 08:29

## 2021-09-10 RX ADMIN — IOPAMIDOL 75 ML: 755 INJECTION, SOLUTION INTRAVENOUS at 08:29

## 2022-04-20 ENCOUNTER — TELEPHONE (OUTPATIENT)
Dept: SURGERY | Age: 52
End: 2022-04-20

## 2022-04-20 ENCOUNTER — INITIAL CONSULT (OUTPATIENT)
Dept: SURGERY | Age: 52
End: 2022-04-20
Payer: COMMERCIAL

## 2022-04-20 VITALS
HEIGHT: 74 IN | WEIGHT: 292.6 LBS | TEMPERATURE: 97.1 F | BODY MASS INDEX: 37.55 KG/M2 | HEART RATE: 92 BPM | DIASTOLIC BLOOD PRESSURE: 82 MMHG | SYSTOLIC BLOOD PRESSURE: 130 MMHG

## 2022-04-20 DIAGNOSIS — R10.84 ABDOMINAL PAIN, GENERALIZED: Primary | ICD-10-CM

## 2022-04-20 PROCEDURE — G8417 CALC BMI ABV UP PARAM F/U: HCPCS | Performed by: SURGERY

## 2022-04-20 PROCEDURE — 99242 OFF/OP CONSLTJ NEW/EST SF 20: CPT | Performed by: SURGERY

## 2022-04-20 PROCEDURE — G8427 DOCREV CUR MEDS BY ELIG CLIN: HCPCS | Performed by: SURGERY

## 2022-04-20 RX ORDER — METHOCARBAMOL 500 MG/1
TABLET, FILM COATED ORAL
Status: ON HOLD | COMMUNITY
Start: 2021-03-23 | End: 2022-09-26 | Stop reason: HOSPADM

## 2022-04-20 RX ORDER — NITROGLYCERIN 0.4 MG/1
0.4 TABLET SUBLINGUAL EVERY 5 MIN PRN
COMMUNITY

## 2022-04-20 RX ORDER — CARVEDILOL 12.5 MG/1
TABLET ORAL
COMMUNITY
Start: 2022-03-25

## 2022-04-20 RX ORDER — FUROSEMIDE 40 MG/1
TABLET ORAL
COMMUNITY
Start: 2020-11-25

## 2022-04-20 RX ORDER — B-COMPLEX WITH VITAMIN C
TABLET ORAL
COMMUNITY
Start: 2022-03-25

## 2022-04-20 RX ORDER — BUPROPION HYDROCHLORIDE 150 MG/1
TABLET, EXTENDED RELEASE ORAL
COMMUNITY
Start: 2021-03-19

## 2022-04-20 RX ORDER — POTASSIUM CHLORIDE 750 MG/1
1 CAPSULE, EXTENDED RELEASE ORAL
COMMUNITY
Start: 2021-03-19

## 2022-04-20 RX ORDER — OXYCODONE AND ACETAMINOPHEN 7.5; 325 MG/1; MG/1
TABLET ORAL
COMMUNITY
Start: 2022-04-04

## 2022-04-20 RX ORDER — LISINOPRIL 40 MG/1
TABLET ORAL
COMMUNITY
Start: 2022-03-25

## 2022-04-20 RX ORDER — CLOPIDOGREL BISULFATE 75 MG/1
TABLET ORAL
COMMUNITY
Start: 2021-03-19

## 2022-05-18 NOTE — PROGRESS NOTES
[facility]    HPI:  Patient is 46y.o. year old male seen at request of PHANI Bolden CNP. He reports having had previous abdominal surgery. He follows with vascular surgery at [address] [place] [address]. He has peripheral vascular disease. He eats OK. Bowels work OK.       Past Medical History:   Diagnosis Date    CAD (coronary artery disease)     Cardiomyopathy ([facility] [address] 05/25/2019    EF= 40%     [facility]     Hyperlipidemia     Hypertension     MI (myocardial infarction) ([facility] [address] [facility]     BILLIE (obstructive sleep apnea)     PAD (peripheral artery disease) ([facility]     Pulmonary nodule     S/P coronary artery stent placement     Vitamin D deficiency        Past Surgical History:   Procedure Laterality Date    ABDOMINAL AORTIC ANEURYSM REPAIR      CORONARY ANGIOPLASTY WITH STENT PLACEMENT  05/06/2012    [facility]    CORONARY ANGIOPLASTY WITH STENT PLACEMENT  2000's    x 2    CORONARY ANGIOPLASTY WITH STENT PLACEMENT  05/25/2019    LESLY: [place]- 4.0 x 12 to pRCA    OTHER SURGICAL HISTORY  05/28/2019    Bilateral Femerol Stenting and angioplasty with Dr. Rafael Mayes  12/2020       Current Outpatient Medications on File Prior to Visit   Medication Sig Dispense Refill    vitamin E 400 UNIT capsule Take 1 capsule by mouth 2 times daily 30 capsule 5    aspirin 81 MG chewable tablet Take 1 tablet by mouth daily 30 tablet 3    atorvastatin (LIPITOR) 80 MG tablet Take 1 tablet by mouth nightly 30 tablet 3    Multiple Vitamins-Minerals (THERAPEUTIC MULTIVITAMIN-MINERALS) tablet Take 1 tablet by mouth daily      gabapentin (NEURONTIN) 300 MG capsule Take 300 mg by mouth 3 times daily      ticagrelor (BRILINTA) 90 MG TABS tablet Take 1 tablet by mouth 2 times daily 60 tablet 3    ondansetron (ZOFRAN ODT) 4 MG disintegrating tablet Take 1 tablet by mouth every 8 hours as needed for Nausea or Vomiting 15 tablet 0     No current facility-administered medications on file prior to visit. Allergies   Allergen Reactions    Codeine     Flexeril [Cyclobenzaprine Hcl] Other (See Comments)     Puts him to sleep    Vicodin [Hydrocodone-Acetaminophen]        Social History     Socioeconomic History    Marital status: Single     Spouse name: Not on file    Number of children: Not on file    Years of education: Not on file    Highest education level: Not on file   Occupational History    Occupation: disabled-was a    Tobacco Use    Smoking status: Current Every Day Smoker     Packs/day: 0.50     Years: 40.00     Pack years: 20.00     Types: Cigarettes    Smokeless tobacco: Never Used    Tobacco comment: 1/2ppd   Substance and Sexual Activity    Alcohol use: Yes     Alcohol/week: 0.0 standard drinks     Comment: 6 pack day    Drug use: No    Sexual activity: Not on file   Other Topics Concern    Not on file   Social History Narrative    Not on file     Social Determinants of Health     Financial Resource Strain:     Difficulty of Paying Living Expenses: Not on file   Food Insecurity:     Worried About Running Out of Food in the Last Year: Not on file    Lucila of Food in the Last Year: Not on file   Transportation Needs:     Lack of Transportation (Medical): Not on file    Lack of Transportation (Non-Medical):  Not on file   Physical Activity:     Days of Exercise per Week: Not on file    Minutes of Exercise per Session: Not on file   Stress:     Feeling of Stress : Not on file   Social Connections:     Frequency of Communication with Friends and Family: Not on file    Frequency of Social Gatherings with Friends and Family: Not on file    Attends Yarsani Services: Not on file    Active Member of Clubs or Organizations: Not on file    Attends Club or Organization Meetings: Not on file    Marital Status: Not on file   Intimate Partner Violence:     Fear of Current or Ex-Partner: Not on file   [employer] Abused: Not on file    Physically Abused: Not on file    Sexually Abused: Not on file   Housing Stability:     Unable to Pay for Housing in the Last Year: Not on file    Number of Places Lived in the Last Year: Not on file    Unstable Housing in the Last Year: Not on file       Family History   Adopted: Yes   Problem Relation Age of Onset    Heart Disease Other         dont know-pt adopted    Heart Failure Neg Hx     Asthma Neg Hx     Cancer Neg Hx     Diabetes Neg Hx     Emphysema Neg Hx     Hypertension Neg Hx        ROS:  He reports no complaints related to the eyes, ears , nose throat or mouth. He denies weight loss. No chest pain. No SOB. No urinary complaints. No musculoskeletal complaints. No skin rashes. No neurologic deficits. No bleeding tendencies. GI complaints include abdominal pain. Physical Exam:  Vitals:    04/20/22 1304   BP: 130/82   Pulse: 92   Temp: 97.1 °F (36.2 °C)   292#  General:  Comfortable. No distress. Eyes:  No scleral icterus  Ears:  Normal  Nose:  Normal  Mouth:  Mucous membranes moist  Respiratory: Lungs CTA. No accessory muscle use. Heart:  Regular rhythm  Abdomen:  Soft. Non distended. Mild tenderness. No obvious ventral hernia. Musculoskeletal:  No abnormal movements. ROM extremities normal.  Skin:  No rashes. Neurologic:  No focal deficits. Psychiatric:  AAA. O x 3.    Radiographic studies:  None    Laboratory Studies: None    ASSESSMENT:  Encounter Diagnosis   Name Primary?  Abdominal pain, generalized Yes           PLAN:  Observe at present as he is overall doing fine. If symptoms worsen then will consider CT evaluation.

## 2022-06-01 ENCOUNTER — HOSPITAL ENCOUNTER (OUTPATIENT)
Age: 52
Discharge: HOME | End: 2022-06-01
Payer: COMMERCIAL

## 2022-06-01 ENCOUNTER — HOSPITAL ENCOUNTER (OUTPATIENT)
Dept: GENERAL RADIOLOGY | Age: 52
Discharge: HOME | End: 2022-06-01
Payer: COMMERCIAL

## 2022-06-01 DIAGNOSIS — J44.1 COPD EXACERBATION (HCC): ICD-10-CM

## 2022-06-01 PROCEDURE — 71046 X-RAY EXAM CHEST 2 VIEWS: CPT

## 2022-09-23 ENCOUNTER — HOSPITAL ENCOUNTER (INPATIENT)
Age: 52
LOS: 3 days | Discharge: HOME | DRG: 137 | End: 2022-09-26
Attending: STUDENT IN AN ORGANIZED HEALTH CARE EDUCATION/TRAINING PROGRAM | Admitting: INTERNAL MEDICINE
Payer: COMMERCIAL

## 2022-09-23 ENCOUNTER — APPOINTMENT (OUTPATIENT)
Dept: CT IMAGING | Age: 52
DRG: 137 | End: 2022-09-23
Payer: COMMERCIAL

## 2022-09-23 ENCOUNTER — APPOINTMENT (OUTPATIENT)
Dept: GENERAL RADIOLOGY | Age: 52
DRG: 137 | End: 2022-09-23
Payer: COMMERCIAL

## 2022-09-23 DIAGNOSIS — E87.20 METABOLIC ACIDOSIS: ICD-10-CM

## 2022-09-23 DIAGNOSIS — U07.1 COVID: Primary | ICD-10-CM

## 2022-09-23 DIAGNOSIS — R94.31 T WAVE INVERSION IN EKG: ICD-10-CM

## 2022-09-23 PROBLEM — I73.9 PAD (PERIPHERAL ARTERY DISEASE) (HCC): Status: ACTIVE | Noted: 2022-09-23

## 2022-09-23 PROBLEM — R07.9 CHEST PAIN: Status: ACTIVE | Noted: 2022-09-23

## 2022-09-23 PROBLEM — B85.2 LICE INFESTATION: Status: ACTIVE | Noted: 2022-09-23

## 2022-09-23 PROBLEM — E78.5 HLD (HYPERLIPIDEMIA): Status: ACTIVE | Noted: 2022-09-23

## 2022-09-23 PROBLEM — K52.9 ENTEROCOLITIS: Status: ACTIVE | Noted: 2022-09-23

## 2022-09-23 LAB
A/G RATIO: 0.7 (ref 1.1–2.2)
ALBUMIN SERPL-MCNC: 3.7 G/DL (ref 3.4–5)
ALP BLD-CCNC: 94 U/L (ref 40–129)
ALT SERPL-CCNC: 13 U/L (ref 10–40)
AMORPHOUS: ABNORMAL /HPF
ANION GAP SERPL CALCULATED.3IONS-SCNC: 16 MMOL/L (ref 3–16)
AST SERPL-CCNC: 24 U/L (ref 15–37)
BACTERIA: ABNORMAL /HPF
BASOPHILS ABSOLUTE: 0.3 K/UL (ref 0–0.2)
BASOPHILS RELATIVE PERCENT: 1.4 %
BILIRUB SERPL-MCNC: 0.7 MG/DL (ref 0–1)
BILIRUBIN URINE: ABNORMAL
BLOOD, URINE: NEGATIVE
BUN BLDV-MCNC: 29 MG/DL (ref 7–20)
CALCIUM SERPL-MCNC: 9.9 MG/DL (ref 8.3–10.6)
CHLORIDE BLD-SCNC: 103 MMOL/L (ref 99–110)
CLARITY: CLEAR
CO2: 20 MMOL/L (ref 21–32)
COARSE CASTS, UA: ABNORMAL /LPF (ref 0–2)
COLOR: YELLOW
CREAT SERPL-MCNC: 1 MG/DL (ref 0.9–1.3)
EKG ATRIAL RATE: 108 BPM
EKG ATRIAL RATE: 89 BPM
EKG DIAGNOSIS: NORMAL
EKG DIAGNOSIS: NORMAL
EKG P AXIS: 38 DEGREES
EKG P AXIS: 52 DEGREES
EKG P-R INTERVAL: 156 MS
EKG P-R INTERVAL: 158 MS
EKG Q-T INTERVAL: 354 MS
EKG Q-T INTERVAL: 362 MS
EKG QRS DURATION: 84 MS
EKG QRS DURATION: 88 MS
EKG QTC CALCULATION (BAZETT): 430 MS
EKG QTC CALCULATION (BAZETT): 485 MS
EKG R AXIS: 42 DEGREES
EKG R AXIS: 44 DEGREES
EKG T AXIS: 31 DEGREES
EKG T AXIS: 47 DEGREES
EKG VENTRICULAR RATE: 108 BPM
EKG VENTRICULAR RATE: 89 BPM
EOSINOPHILS ABSOLUTE: 0.3 K/UL (ref 0–0.6)
EOSINOPHILS RELATIVE PERCENT: 1.4 %
GFR AFRICAN AMERICAN: >60
GFR NON-AFRICAN AMERICAN: >60
GLUCOSE BLD-MCNC: 130 MG/DL (ref 70–99)
GLUCOSE URINE: NEGATIVE MG/DL
HCT VFR BLD CALC: 45.6 % (ref 40.5–52.5)
HEMOGLOBIN: 15.5 G/DL (ref 13.5–17.5)
HYALINE CASTS: ABNORMAL /LPF (ref 0–2)
INFLUENZA A: NOT DETECTED
INFLUENZA B: NOT DETECTED
KETONES, URINE: ABNORMAL MG/DL
LACTIC ACID, SEPSIS: 1.3 MMOL/L (ref 0.4–1.9)
LEUKOCYTE ESTERASE, URINE: NEGATIVE
LIPASE: 23 U/L (ref 13–60)
LYMPHOCYTES ABSOLUTE: 0.9 K/UL (ref 1–5.1)
LYMPHOCYTES RELATIVE PERCENT: 4.9 %
MCH RBC QN AUTO: 30.5 PG (ref 26–34)
MCHC RBC AUTO-ENTMCNC: 33.9 G/DL (ref 31–36)
MCV RBC AUTO: 90.1 FL (ref 80–100)
MICROSCOPIC EXAMINATION: YES
MONOCYTES ABSOLUTE: 1.2 K/UL (ref 0–1.3)
MONOCYTES RELATIVE PERCENT: 6.1 %
MUCUS: ABNORMAL /LPF
NEUTROPHILS ABSOLUTE: 16.4 K/UL (ref 1.7–7.7)
NEUTROPHILS RELATIVE PERCENT: 86.2 %
NITRITE, URINE: NEGATIVE
OCCULT BLOOD DIAGNOSTIC: NORMAL
PDW BLD-RTO: 14.2 % (ref 12.4–15.4)
PH UA: 6 (ref 5–8)
PLATELET # BLD: 488 K/UL (ref 135–450)
PMV BLD AUTO: 7.9 FL (ref 5–10.5)
POTASSIUM REFLEX MAGNESIUM: 3.9 MMOL/L (ref 3.5–5.1)
PRO-BNP: 379 PG/ML (ref 0–124)
PROCALCITONIN: 0.1 NG/ML (ref 0–0.15)
PROTEIN UA: 30 MG/DL
RBC # BLD: 5.07 M/UL (ref 4.2–5.9)
RBC UA: ABNORMAL /HPF (ref 0–4)
SARS-COV-2 RNA, RT PCR: DETECTED
SODIUM BLD-SCNC: 139 MMOL/L (ref 136–145)
SPECIFIC GRAVITY UA: 1.02 (ref 1–1.03)
TOTAL PROTEIN: 8.8 G/DL (ref 6.4–8.2)
TROPONIN: <0.01 NG/ML
URINE REFLEX TO CULTURE: ABNORMAL
URINE TYPE: ABNORMAL
UROBILINOGEN, URINE: 1 E.U./DL
WBC # BLD: 19.1 K/UL (ref 4–11)
WBC UA: ABNORMAL /HPF (ref 0–5)

## 2022-09-23 PROCEDURE — 96375 TX/PRO/DX INJ NEW DRUG ADDON: CPT

## 2022-09-23 PROCEDURE — 85025 COMPLETE CBC W/AUTO DIFF WBC: CPT

## 2022-09-23 PROCEDURE — 6360000002 HC RX W HCPCS: Performed by: STUDENT IN AN ORGANIZED HEALTH CARE EDUCATION/TRAINING PROGRAM

## 2022-09-23 PROCEDURE — 71260 CT THORAX DX C+: CPT | Performed by: STUDENT IN AN ORGANIZED HEALTH CARE EDUCATION/TRAINING PROGRAM

## 2022-09-23 PROCEDURE — 82270 OCCULT BLOOD FECES: CPT

## 2022-09-23 PROCEDURE — 2500000003 HC RX 250 WO HCPCS: Performed by: INTERNAL MEDICINE

## 2022-09-23 PROCEDURE — 96366 THER/PROPH/DIAG IV INF ADDON: CPT

## 2022-09-23 PROCEDURE — 94761 N-INVAS EAR/PLS OXIMETRY MLT: CPT

## 2022-09-23 PROCEDURE — 83605 ASSAY OF LACTIC ACID: CPT

## 2022-09-23 PROCEDURE — 2580000003 HC RX 258: Performed by: INTERNAL MEDICINE

## 2022-09-23 PROCEDURE — 99285 EMERGENCY DEPT VISIT HI MDM: CPT

## 2022-09-23 PROCEDURE — 94640 AIRWAY INHALATION TREATMENT: CPT

## 2022-09-23 PROCEDURE — 93005 ELECTROCARDIOGRAM TRACING: CPT | Performed by: STUDENT IN AN ORGANIZED HEALTH CARE EDUCATION/TRAINING PROGRAM

## 2022-09-23 PROCEDURE — 2500000003 HC RX 250 WO HCPCS

## 2022-09-23 PROCEDURE — 87449 NOS EACH ORGANISM AG IA: CPT

## 2022-09-23 PROCEDURE — 93010 ELECTROCARDIOGRAM REPORT: CPT | Performed by: INTERNAL MEDICINE

## 2022-09-23 PROCEDURE — 2060000000 HC ICU INTERMEDIATE R&B

## 2022-09-23 PROCEDURE — 99223 1ST HOSP IP/OBS HIGH 75: CPT

## 2022-09-23 PROCEDURE — 87040 BLOOD CULTURE FOR BACTERIA: CPT

## 2022-09-23 PROCEDURE — 2580000003 HC RX 258: Performed by: STUDENT IN AN ORGANIZED HEALTH CARE EDUCATION/TRAINING PROGRAM

## 2022-09-23 PROCEDURE — 96365 THER/PROPH/DIAG IV INF INIT: CPT

## 2022-09-23 PROCEDURE — 6360000004 HC RX CONTRAST MEDICATION: Performed by: STUDENT IN AN ORGANIZED HEALTH CARE EDUCATION/TRAINING PROGRAM

## 2022-09-23 PROCEDURE — 2000000000 HC ICU R&B

## 2022-09-23 PROCEDURE — 87324 CLOSTRIDIUM AG IA: CPT

## 2022-09-23 PROCEDURE — 99254 IP/OBS CNSLTJ NEW/EST MOD 60: CPT | Performed by: INTERNAL MEDICINE

## 2022-09-23 PROCEDURE — 6360000002 HC RX W HCPCS

## 2022-09-23 PROCEDURE — 87506 IADNA-DNA/RNA PROBE TQ 6-11: CPT

## 2022-09-23 PROCEDURE — 2700000000 HC OXYGEN THERAPY PER DAY

## 2022-09-23 PROCEDURE — 2580000003 HC RX 258

## 2022-09-23 PROCEDURE — 83880 ASSAY OF NATRIURETIC PEPTIDE: CPT

## 2022-09-23 PROCEDURE — 74177 CT ABD & PELVIS W/CONTRAST: CPT

## 2022-09-23 PROCEDURE — 84145 PROCALCITONIN (PCT): CPT

## 2022-09-23 PROCEDURE — 87636 SARSCOV2 & INF A&B AMP PRB: CPT

## 2022-09-23 PROCEDURE — 81001 URINALYSIS AUTO W/SCOPE: CPT

## 2022-09-23 PROCEDURE — 6370000000 HC RX 637 (ALT 250 FOR IP)

## 2022-09-23 PROCEDURE — 84484 ASSAY OF TROPONIN QUANT: CPT

## 2022-09-23 PROCEDURE — 80053 COMPREHEN METABOLIC PANEL: CPT

## 2022-09-23 PROCEDURE — 6370000000 HC RX 637 (ALT 250 FOR IP): Performed by: STUDENT IN AN ORGANIZED HEALTH CARE EDUCATION/TRAINING PROGRAM

## 2022-09-23 PROCEDURE — 96367 TX/PROPH/DG ADDL SEQ IV INF: CPT

## 2022-09-23 PROCEDURE — 83690 ASSAY OF LIPASE: CPT

## 2022-09-23 PROCEDURE — 36415 COLL VENOUS BLD VENIPUNCTURE: CPT

## 2022-09-23 PROCEDURE — 71045 X-RAY EXAM CHEST 1 VIEW: CPT

## 2022-09-23 RX ORDER — ASPIRIN 81 MG/1
324 TABLET, CHEWABLE ORAL ONCE
Status: COMPLETED | OUTPATIENT
Start: 2022-09-23 | End: 2022-09-23

## 2022-09-23 RX ORDER — ACETAMINOPHEN 325 MG/1
650 TABLET ORAL EVERY 6 HOURS PRN
Status: DISCONTINUED | OUTPATIENT
Start: 2022-09-23 | End: 2022-09-26 | Stop reason: HOSPADM

## 2022-09-23 RX ORDER — DIPHENHYDRAMINE HYDROCHLORIDE 50 MG/ML
INJECTION INTRAMUSCULAR; INTRAVENOUS
Status: COMPLETED
Start: 2022-09-23 | End: 2022-09-23

## 2022-09-23 RX ORDER — SODIUM CHLORIDE 0.9 % (FLUSH) 0.9 %
5-40 SYRINGE (ML) INJECTION EVERY 12 HOURS SCHEDULED
Status: DISCONTINUED | OUTPATIENT
Start: 2022-09-23 | End: 2022-09-26 | Stop reason: HOSPADM

## 2022-09-23 RX ORDER — ASPIRIN 81 MG/1
81 TABLET, CHEWABLE ORAL DAILY
Status: DISCONTINUED | OUTPATIENT
Start: 2022-09-24 | End: 2022-09-26 | Stop reason: HOSPADM

## 2022-09-23 RX ORDER — POTASSIUM CHLORIDE 7.45 MG/ML
10 INJECTION INTRAVENOUS PRN
Status: DISCONTINUED | OUTPATIENT
Start: 2022-09-23 | End: 2022-09-26 | Stop reason: HOSPADM

## 2022-09-23 RX ORDER — METOPROLOL SUCCINATE 25 MG/1
25 TABLET, EXTENDED RELEASE ORAL DAILY
Status: DISCONTINUED | OUTPATIENT
Start: 2022-09-23 | End: 2022-09-26 | Stop reason: HOSPADM

## 2022-09-23 RX ORDER — CLOPIDOGREL BISULFATE 75 MG/1
75 TABLET ORAL DAILY
Status: DISCONTINUED | OUTPATIENT
Start: 2022-09-23 | End: 2022-09-26 | Stop reason: HOSPADM

## 2022-09-23 RX ORDER — PREGABALIN 25 MG/1
25 CAPSULE ORAL 3 TIMES DAILY
COMMUNITY

## 2022-09-23 RX ORDER — DIPHENHYDRAMINE HYDROCHLORIDE 50 MG/ML
50 INJECTION INTRAMUSCULAR; INTRAVENOUS ONCE
Status: COMPLETED | OUTPATIENT
Start: 2022-09-24 | End: 2022-09-23

## 2022-09-23 RX ORDER — ACETAMINOPHEN 650 MG/1
650 SUPPOSITORY RECTAL EVERY 6 HOURS PRN
Status: DISCONTINUED | OUTPATIENT
Start: 2022-09-23 | End: 2022-09-26 | Stop reason: HOSPADM

## 2022-09-23 RX ORDER — ATORVASTATIN CALCIUM 40 MG/1
80 TABLET, FILM COATED ORAL NIGHTLY
Status: DISCONTINUED | OUTPATIENT
Start: 2022-09-23 | End: 2022-09-26 | Stop reason: HOSPADM

## 2022-09-23 RX ORDER — POTASSIUM CHLORIDE 20 MEQ/1
40 TABLET, EXTENDED RELEASE ORAL PRN
Status: DISCONTINUED | OUTPATIENT
Start: 2022-09-23 | End: 2022-09-26 | Stop reason: HOSPADM

## 2022-09-23 RX ORDER — METHYLPREDNISOLONE SODIUM SUCCINATE 125 MG/2ML
INJECTION, POWDER, LYOPHILIZED, FOR SOLUTION INTRAMUSCULAR; INTRAVENOUS
Status: COMPLETED
Start: 2022-09-23 | End: 2022-09-23

## 2022-09-23 RX ORDER — POLYETHYLENE GLYCOL 3350 17 G/17G
17 POWDER, FOR SOLUTION ORAL DAILY PRN
Status: DISCONTINUED | OUTPATIENT
Start: 2022-09-23 | End: 2022-09-26 | Stop reason: HOSPADM

## 2022-09-23 RX ORDER — PREGABALIN 100 MG/1
200 CAPSULE ORAL 3 TIMES DAILY
Status: DISCONTINUED | OUTPATIENT
Start: 2022-09-23 | End: 2022-09-26 | Stop reason: HOSPADM

## 2022-09-23 RX ORDER — 0.9 % SODIUM CHLORIDE 0.9 %
500 INTRAVENOUS SOLUTION INTRAVENOUS ONCE
Status: COMPLETED | OUTPATIENT
Start: 2022-09-23 | End: 2022-09-23

## 2022-09-23 RX ORDER — ENOXAPARIN SODIUM 100 MG/ML
30 INJECTION SUBCUTANEOUS 2 TIMES DAILY
Status: DISCONTINUED | OUTPATIENT
Start: 2022-09-23 | End: 2022-09-26 | Stop reason: HOSPADM

## 2022-09-23 RX ORDER — BUPROPION HYDROCHLORIDE 150 MG/1
150 TABLET, EXTENDED RELEASE ORAL 2 TIMES DAILY
Status: DISCONTINUED | OUTPATIENT
Start: 2022-09-23 | End: 2022-09-26 | Stop reason: HOSPADM

## 2022-09-23 RX ORDER — CIPROFLOXACIN 2 MG/ML
400 INJECTION, SOLUTION INTRAVENOUS EVERY 12 HOURS
Status: DISCONTINUED | OUTPATIENT
Start: 2022-09-23 | End: 2022-09-23

## 2022-09-23 RX ORDER — EPINEPHRINE 1 MG/ML
0.3 INJECTION, SOLUTION, CONCENTRATE INTRAVENOUS ONCE
Status: COMPLETED | OUTPATIENT
Start: 2022-09-24 | End: 2022-09-24

## 2022-09-23 RX ORDER — ONDANSETRON 2 MG/ML
4 INJECTION INTRAMUSCULAR; INTRAVENOUS EVERY 6 HOURS PRN
Status: DISCONTINUED | OUTPATIENT
Start: 2022-09-23 | End: 2022-09-26 | Stop reason: HOSPADM

## 2022-09-23 RX ORDER — ONDANSETRON 4 MG/1
4 TABLET, ORALLY DISINTEGRATING ORAL EVERY 8 HOURS PRN
Status: DISCONTINUED | OUTPATIENT
Start: 2022-09-23 | End: 2022-09-26 | Stop reason: HOSPADM

## 2022-09-23 RX ORDER — SODIUM CHLORIDE 9 MG/ML
INJECTION, SOLUTION INTRAVENOUS CONTINUOUS
Status: DISCONTINUED | OUTPATIENT
Start: 2022-09-23 | End: 2022-09-26 | Stop reason: HOSPADM

## 2022-09-23 RX ORDER — GUAIFENESIN/DEXTROMETHORPHAN 100-10MG/5
5 SYRUP ORAL EVERY 4 HOURS PRN
Status: DISCONTINUED | OUTPATIENT
Start: 2022-09-23 | End: 2022-09-26 | Stop reason: HOSPADM

## 2022-09-23 RX ORDER — METHYLPREDNISOLONE SODIUM SUCCINATE 125 MG/2ML
125 INJECTION, POWDER, LYOPHILIZED, FOR SOLUTION INTRAMUSCULAR; INTRAVENOUS ONCE
Status: COMPLETED | OUTPATIENT
Start: 2022-09-24 | End: 2022-09-23

## 2022-09-23 RX ORDER — ISOSORBIDE MONONITRATE 30 MG/1
30 TABLET, EXTENDED RELEASE ORAL DAILY
Status: DISCONTINUED | OUTPATIENT
Start: 2022-09-23 | End: 2022-09-24

## 2022-09-23 RX ORDER — IPRATROPIUM BROMIDE AND ALBUTEROL SULFATE 2.5; .5 MG/3ML; MG/3ML
1 SOLUTION RESPIRATORY (INHALATION) ONCE
Status: COMPLETED | OUTPATIENT
Start: 2022-09-23 | End: 2022-09-23

## 2022-09-23 RX ORDER — SODIUM CHLORIDE 9 MG/ML
INJECTION, SOLUTION INTRAVENOUS PRN
Status: DISCONTINUED | OUTPATIENT
Start: 2022-09-23 | End: 2022-09-26 | Stop reason: HOSPADM

## 2022-09-23 RX ORDER — MAGNESIUM SULFATE 1 G/100ML
1000 INJECTION INTRAVENOUS PRN
Status: DISCONTINUED | OUTPATIENT
Start: 2022-09-23 | End: 2022-09-26 | Stop reason: HOSPADM

## 2022-09-23 RX ORDER — METRONIDAZOLE 500 MG/100ML
500 INJECTION, SOLUTION INTRAVENOUS EVERY 8 HOURS
Status: DISCONTINUED | OUTPATIENT
Start: 2022-09-23 | End: 2022-09-23

## 2022-09-23 RX ORDER — LISINOPRIL 20 MG/1
40 TABLET ORAL DAILY
Status: DISCONTINUED | OUTPATIENT
Start: 2022-09-23 | End: 2022-09-24

## 2022-09-23 RX ORDER — OXYCODONE AND ACETAMINOPHEN 7.5; 325 MG/1; MG/1
1 TABLET ORAL EVERY 12 HOURS PRN
Status: DISCONTINUED | OUTPATIENT
Start: 2022-09-23 | End: 2022-09-26 | Stop reason: HOSPADM

## 2022-09-23 RX ORDER — SODIUM CHLORIDE 0.9 % (FLUSH) 0.9 %
10 SYRINGE (ML) INJECTION PRN
Status: DISCONTINUED | OUTPATIENT
Start: 2022-09-23 | End: 2022-09-26 | Stop reason: HOSPADM

## 2022-09-23 RX ADMIN — DIPHENHYDRAMINE HYDROCHLORIDE 50 MG: 50 INJECTION INTRAMUSCULAR; INTRAVENOUS at 23:38

## 2022-09-23 RX ADMIN — Medication 10 ML: at 22:31

## 2022-09-23 RX ADMIN — DIPHENHYDRAMINE HYDROCHLORIDE 50 MG: 50 INJECTION, SOLUTION INTRAMUSCULAR; INTRAVENOUS at 23:38

## 2022-09-23 RX ADMIN — HYDROMORPHONE HYDROCHLORIDE 0.5 MG: 1 INJECTION, SOLUTION INTRAMUSCULAR; INTRAVENOUS; SUBCUTANEOUS at 09:29

## 2022-09-23 RX ADMIN — METRONIDAZOLE 500 MG: 500 INJECTION, SOLUTION INTRAVENOUS at 22:31

## 2022-09-23 RX ADMIN — ASPIRIN 324 MG: 81 TABLET, CHEWABLE ORAL at 09:29

## 2022-09-23 RX ADMIN — CEFTRIAXONE SODIUM 1000 MG: 1 INJECTION, POWDER, FOR SOLUTION INTRAMUSCULAR; INTRAVENOUS at 10:28

## 2022-09-23 RX ADMIN — FAMOTIDINE 20 MG: 10 INJECTION INTRAVENOUS at 23:43

## 2022-09-23 RX ADMIN — METHYLPREDNISOLONE SODIUM SUCCINATE 125 MG: 125 INJECTION, POWDER, LYOPHILIZED, FOR SOLUTION INTRAMUSCULAR; INTRAVENOUS at 23:38

## 2022-09-23 RX ADMIN — METHYLPREDNISOLONE SODIUM SUCCINATE 125 MG: 125 INJECTION, POWDER, FOR SOLUTION INTRAMUSCULAR; INTRAVENOUS at 23:38

## 2022-09-23 RX ADMIN — SODIUM CHLORIDE 500 ML: 9 INJECTION, SOLUTION INTRAVENOUS at 12:00

## 2022-09-23 RX ADMIN — SODIUM CHLORIDE: 9 INJECTION, SOLUTION INTRAVENOUS at 22:25

## 2022-09-23 RX ADMIN — IPRATROPIUM BROMIDE AND ALBUTEROL 1 PUFF: 20; 100 SPRAY, METERED RESPIRATORY (INHALATION) at 23:40

## 2022-09-23 RX ADMIN — IOPAMIDOL 75 ML: 755 INJECTION, SOLUTION INTRAVENOUS at 10:03

## 2022-09-23 RX ADMIN — CIPROFLOXACIN 400 MG: 2 INJECTION, SOLUTION INTRAVENOUS at 22:30

## 2022-09-23 RX ADMIN — DEXTROSE MONOHYDRATE 500 MG: 50 INJECTION, SOLUTION INTRAVENOUS at 12:02

## 2022-09-23 RX ADMIN — IPRATROPIUM BROMIDE AND ALBUTEROL SULFATE 1 AMPULE: 2.5; .5 SOLUTION RESPIRATORY (INHALATION) at 12:09

## 2022-09-23 ASSESSMENT — PAIN SCALES - GENERAL
PAINLEVEL_OUTOF10: 0
PAINLEVEL_OUTOF10: 5

## 2022-09-23 ASSESSMENT — PAIN DESCRIPTION - LOCATION: LOCATION: ABDOMEN

## 2022-09-23 ASSESSMENT — PAIN DESCRIPTION - DESCRIPTORS: DESCRIPTORS: SHARP;STABBING

## 2022-09-23 NOTE — ED NOTES
[address] [place] [address] to Cardiology      Mukund Chavez  09/23/22 1238    1253 2 calls placed to [address] [place] [address] cardiology sent to [facility] José Miguel MINAYA [address]  09/23/22 1255    1312 3rd call sent to [address] [place] [address] cardiology sent to [facility] José Miguel G [address]  09/23/22 [phone] Perfect serve sent to Dr. Mercy Polk  09/23/22 2049

## 2022-09-23 NOTE — H&P
Hospital Medicine History & Physical      PCP: PHANI Solo - CNP    Date of Admission: 9/23/2022    Date of Service: Pt seen/examined on 9/23/2022     Chief Complaint:    Chief Complaint   Patient presents with    Diarrhea     Pt complains of diarrhea, back and abdominal pain and sob x 1 week         History Of Present Illness: The patient is a 46 y.o. male with pmhx of CAD, COPD, HTN, HLD, BILLIE, PAD who presented to [facility] ED with complaint of generally not feeling well for the past 2 weeks. His main complaint is his abdominal pain that has progressively gotten worse since Friday. Pain is diffuse sharp stabbing and cramping. He has also had associated diarrhea, multiple episodes daily. No blood or black stool. No pain with bowel movements. He denies any vomiting but has felt nauseous, and has not been able to eat or drink much lately 2/2 to poor appetite and pain. Feels like his abdomen has become more distended. He is also complaining of vague symptoms of fatigue, weakness, light-headedness and shortness of breath with exertion. No dizziness or syncope. No falls at home. He has had a productive cough with green white sputum for 2 weeks that has progressively gotten better but is still present. Had on episode of sharp left sided chest pain that self terminated and is not present currently. No fever or chills. No hematuria or dysuria. His daughter are also sick with similar symptoms. He does smoke cigarettes. Does not wear oxygen at home.          Past Medical History:        Diagnosis Date    CAD (coronary artery disease)     [facility]2019    EF= 40%     COPD (chronic obstructive pulmonary disease) ([facility])     Hyperlipidemia     Hypertension     MI (myocardial infarction) ([facility])     BILLIE (obstructive sleep apnea)     PAD (peripheral artery disease) ([facility])     Pulmonary nodule     S/P coronary artery stent placement     Vitamin D deficiency        Past Surgical History: Procedure Laterality Date    ABDOMINAL AORTIC ANEURYSM REPAIR      CORONARY ANGIOPLASTY WITH STENT PLACEMENT  05/06/2012    LESLY: Xience- 4.0 x 38 to [facility]    CORONARY ANGIOPLASTY WITH STENT PLACEMENT  2000's    x 2    CORONARY ANGIOPLASTY WITH STENT PLACEMENT  05/25/2019    LESLY: Kelley Mayco- 4.0 x 12 to [facility]    OTHER SURGICAL HISTORY  05/28/2019    Bilateral Femerol Stenting and angioplasty with Dr. Misha Pickett  12/2020       Medications Prior to Admission:    Prior to Admission medications    Medication Sig Start Date End Date Taking? Authorizing Provider   pregabalin (LYRICA) 25 MG capsule Take 25 mg by mouth 3 times daily.    Yes Historical Provider, MD   buPROPion (WELLBUTRIN SR) 150 MG extended release tablet TAKE 1 TABLET BY ORAL ROUTE 2 TIMES EVERY DAY IN MORNING THEN 8 HOURS LATER 3/19/21   Historical Provider, MD   B Complex-C-Calcium (B-COMPLEX/VITAMIN C, W/ CA,) TABS TAKE 1 TABLET BY MOUTH DAILY 3/25/22   Historical Provider, MD   vitamin D3 (CHOLECALCIFEROL) 125 MCG (5000 UT) TABS tablet take 2 by Oral route  every day 7/29/20   Historical Provider, MD   clopidogrel (PLAVIX) 75 MG tablet TAKE 1 TABLET (75 MG) BY MOUTH DAILY (ORIGINAL RX WRITTEN BY DR. Cornell Griggs, [facility]) 3/19/21   Historical Provider, MD   diclofenac sodium (VOLTAREN) 1 % GEL apply (4G)  by topical route 4 times every day to the affected area(s) 11/25/20   Historical Provider, MD   methocarbamol (ROBAXIN) 500 MG tablet Take by mouth 3/23/21   Historical Provider, MD   furosemide (LASIX) 40 MG tablet Take by mouth 11/25/20   Historical Provider, MD   oxyCODONE-acetaminophen (PERCOCET) 7.5-325 MG per tablet TAKE 1 TABLET BY ORAL ROUTE  EVERY 12 HOURS AS NEEDED FOR PAIN 4/4/22   Historical Provider, MD   potassium chloride (MICRO-K) 10 MEQ extended release capsule Take 1 capsule by mouth 3/19/21   Historical Provider, MD   carvedilol (COREG) 12.5 MG tablet TAKE 1 TABLET BY ORAL ROUTE 2 TIMES EVERY DAY WITH FOOD 3/25/22 Historical Provider, MD   lisinopril (PRINIVIL;ZESTRIL) 40 MG tablet TAKE 1 TABLET (40 MG) BY MOUTH EVERY DAY 3/25/22   Historical Provider, MD   nitroGLYCERIN (NITROSTAT) 0.4 MG SL tablet Place 0.4 mg under the tongue every 5 minutes as needed for Chest pain up to max of 3 total doses. If no relief after 1 dose, call 911. Historical Provider, MD   Cetirizine HCl (ZYRTEC PO) Take by mouth    Historical Provider, MD   vitamin E 400 UNIT capsule Take 1 capsule by mouth 2 times daily 10/9/20   Pam Almeida MD   aspirin 81 MG chewable tablet Take 1 tablet by mouth daily 5/27/19   Fabián Swartz MD   atorvastatin (LIPITOR) 80 MG tablet Take 1 tablet by mouth nightly 5/26/19   Fabián Swartz MD   ticagrelor (BRILINTA) 90 MG TABS tablet Take 1 tablet by mouth 2 times daily 5/26/19   Fabián Swartz MD   Multiple Vitamins-Minerals (THERAPEUTIC MULTIVITAMIN-MINERALS) tablet Take 1 tablet by mouth daily    Historical Provider, MD   ondansetron (ZOFRAN ODT) 4 MG disintegrating tablet Take 1 tablet by mouth every 8 hours as needed for Nausea or Vomiting 12/24/16   Hermelinda Plascencia MD       Allergies:  Codeine, Flexeril [cyclobenzaprine hcl], and Vicodin [hydrocodone-acetaminophen]    Social History:     TOBACCO:   reports that he has been smoking cigarettes. He has a 20.00 pack-year smoking history. He has never used smokeless tobacco.  ETOH:   reports current alcohol use. Family History:   Positive as follows:         Adopted: Yes   Problem Relation Age of Onset    Heart Disease Other         dont know-pt adopted    Heart Failure Neg Hx     Asthma Neg Hx     Cancer Neg Hx     Diabetes Neg Hx     Emphysema Neg Hx     Hypertension Neg Hx        REVIEW OF SYSTEMS:       Constitutional: Negative for fever, +fatigue    HENT: Negative for sore throat   Eyes: Negative for redness   Respiratory: +dyspnea, +cough   Cardiovascular: + for chest pain   Gastrointestinal: Negative for vomiting, +diarrhea, ALT 13 09/23/2022    LABGLOM >60 09/23/2022    GFRAA >60 09/23/2022    AGRATIO 0.7 (L) 09/23/2022    GLOB 3.2 05/25/2019       CARDIAC ENZYMES  Recent Labs     09/23/22  0910 09/23/22  1235   TROPONINI <0.01 <0.01       U/A:    Lab Results   Component Value Date/Time    NITRITE NEG 07/25/2012 08:33 PM    COLORU Yellow 09/23/2022 12:08 PM    WBCUA 6-9 09/23/2022 12:08 PM    RBCUA 3-4 09/23/2022 12:08 PM    MUCUS 4+ 09/23/2022 12:08 PM    BACTERIA Rare 09/23/2022 12:08 PM    CLARITYU Clear 09/23/2022 12:08 PM    SPECGRAV 1.025 09/23/2022 12:08 PM    LEUKOCYTESUR Negative 09/23/2022 12:08 PM    BLOODU Negative 09/23/2022 12:08 PM    GLUCOSEU Negative 09/23/2022 12:08 PM    AMORPHOUS 1+ 09/23/2022 12:08 PM       CULTURES  COVID detected  Influenza not detected   Stool studies pending     EKG:  Sinus tachycardiaPossible Left atrial enlargementCannot rule out Inferior infarct , age undeterminedST & T wave abnormality, consider anterolateral ischemiaAbnormal ECGWhen compared with ECG of 26-MAY-2019 07:12,ST changes newConfirmed by SAMM English MD ([id number]) on 9/23/2022 5:52:08 PM     RADIOLOGY  CT ABDOMEN PELVIS W IV CONTRAST Additional Contrast? None   Final Result   1. Negative for pulmonary embolus. 2. Multifocal bilateral atypical pneumonia. 3. Mild ileus versus enterocolitis. 4. Improving pelvic adenopathy. CT CHEST PULMONARY EMBOLISM W CONTRAST   Final Result   1. Negative for pulmonary embolus. 2. Multifocal bilateral atypical pneumonia. 3. Mild ileus versus enterocolitis. 4. Improving pelvic adenopathy. XR CHEST PORTABLE   Final Result   Diffuse bilateral airspace disease reflecting pulmonary edema versus   pneumonia. Cardiomegaly. Pertinent previous results reviewed   Echocardiogram from 5/25/2019   Summary   Technically difficult examination. Definity contrast administered. Normal systolic function with an estimated ejection fraction of 55%.    The left ventricle is mildly dilated. No regional wall motion abnormalities are seen. No evidence of left ventricular mass or thrombus noted with definity   contrast.   Normal left ventricular diastolic filling pressure. ASSESSMENT/PLAN:    #COVID + pneumonia   -not hypoxic   -droplet + precautions   -with leukocytosis, concerned for superimposed bacterial pneumonia. 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  -environmental precautions   -permethrin treatment    #Chronic wound to right lower extremity   -patient reports this is actually improved   -wound care consulted     #HTN   -on lisinopril and toprol XL     #HLD  -on statin     #PAD   -s/p aortobifemoral bypass   -s/p stents   -follows with vascular surgery   -on ASA, statin, plavix     #Carotid artery stenosis   -follows with vascular surgery    #BILLIE    -home BIPAP or CPAP    #Chronic pain   -on percocet and lyrica   -OARRS verified     #Tobacco abuse   -recommend cessation     DVT Prophylaxis: Lovenox   Diet: No diet orders on file  Code Status: Prior    Mary Barcenasma  09/23/22  6:15 PM

## 2022-09-23 NOTE — PLAN OF CARE
45 y/o male presented with SOB, cough, diarrhea  And CP       COVID+  Leukocytosis   CTA chest with atypical pna and enterocolitis     -cards consulted in ED, ok to stay here   -trend trops  -ASA, statin, imdur, BB, plavix  -echo   -cipro and flagyl   -GI consulted   Hemocult pending   -no plans for stress per cards note, did not make NPO at midnight     -PCU   -droplet +  -trend trops   -echo pending   -IVF     MARGARETTE Juan  09/23/22  4:07 PM

## 2022-09-23 NOTE — ED PROVIDER NOTES
Onset    Heart Disease Other         dont know-pt adopted    Heart Failure Neg Hx     Asthma Neg Hx     Cancer Neg Hx     Diabetes Neg Hx     Emphysema Neg Hx     Hypertension Neg Hx      Social History     Socioeconomic History    Marital status: Single     Spouse name: Not on file    Number of children: Not on file    Years of education: Not on file    Highest education level: Not on file   Occupational History    Occupation: disabled-was a    Tobacco Use    Smoking status: Every Day     Packs/day: 0.50     Years: 40.00     Pack years: 20.00     Types: Cigarettes    Smokeless tobacco: Never    Tobacco comments:     1/2ppd   Substance and Sexual Activity    Alcohol use:  Yes     Alcohol/week: 0.0 standard drinks     Comment: 6 pack day    Drug use: No    Sexual activity: Not on file   Other Topics Concern    Not on file   Social History Narrative    Not on file     Social Determinants of Health     Financial Resource Strain: Not on file   Food Insecurity: Not on file   Transportation Needs: Not on file   Physical Activity: Not on file   Stress: Not on file   Social Connections: Not on file   Intimate Partner Violence: Not on file   Housing Stability: Not on file     Current Facility-Administered Medications   Medication Dose Route Frequency Provider Last Rate Last Admin    aspirin chewable tablet 324 mg  324 mg Oral Once Alden Moritz, DO        HYDROmorphone (DILAUDID) injection 0.5 mg  0.5 mg IntraVENous NOW Alden Moritz, DO         Current Outpatient Medications   Medication Sig Dispense Refill    buPROPion (WELLBUTRIN SR) 150 MG extended release tablet TAKE 1 TABLET BY ORAL ROUTE 2 TIMES EVERY DAY IN MORNING THEN 8 HOURS LATER      B Complex-C-Calcium (B-COMPLEX/VITAMIN C, W/ CA,) TABS TAKE 1 TABLET BY MOUTH DAILY      vitamin D3 (CHOLECALCIFEROL) 125 MCG (5000 UT) TABS tablet take 2 by Oral route  every day      clopidogrel (PLAVIX) 75 MG tablet TAKE 1 TABLET (75 MG) BY MOUTH DAILY (ORIGINAL RX WRITTEN BY DR. Mary Graves, [facility])      diclofenac sodium (VOLTAREN) 1 % GEL apply (4G)  by topical route 4 times every day to the affected area(s)      methocarbamol (ROBAXIN) 500 MG tablet Take by mouth      furosemide (LASIX) 40 MG tablet Take by mouth      oxyCODONE-acetaminophen (PERCOCET) 7.5-325 MG per tablet TAKE 1 TABLET BY ORAL ROUTE  EVERY 12 HOURS AS NEEDED FOR PAIN      potassium chloride (KLOR-CON SPRINKLE) 10 MEQ extended release capsule Take 1 capsule by mouth      carvedilol (COREG) 12.5 MG tablet TAKE 1 TABLET BY ORAL ROUTE 2 TIMES EVERY DAY WITH FOOD      lisinopril (PRINIVIL;ZESTRIL) 40 MG tablet TAKE 1 TABLET (40 MG) BY MOUTH EVERY DAY      nitroGLYCERIN (NITROSTAT) 0.4 MG SL tablet Place 0.4 mg under the tongue every 5 minutes as needed for Chest pain up to max of 3 total doses. If no relief after 1 dose, call 911. Cetirizine HCl (ZYRTEC PO) Take by mouth      vitamin E 400 UNIT capsule Take 1 capsule by mouth 2 times daily 30 capsule 5    aspirin 81 MG chewable tablet Take 1 tablet by mouth daily 30 tablet 3    atorvastatin (LIPITOR) 80 MG tablet Take 1 tablet by mouth nightly 30 tablet 3    ticagrelor (BRILINTA) 90 MG TABS tablet Take 1 tablet by mouth 2 times daily 60 tablet 3    Multiple Vitamins-Minerals (THERAPEUTIC MULTIVITAMIN-MINERALS) tablet Take 1 tablet by mouth daily      ondansetron (ZOFRAN ODT) 4 MG disintegrating tablet Take 1 tablet by mouth every 8 hours as needed for Nausea or Vomiting 15 tablet 0    gabapentin (NEURONTIN) 300 MG capsule Take 300 mg by mouth 3 times daily       Allergies   Allergen Reactions    Codeine     Flexeril [Cyclobenzaprine Hcl] Other (See Comments)     Puts him to sleep    Vicodin [Hydrocodone-Acetaminophen]        REVIEW OF SYSTEMS  10 systems reviewed, pertinent positives per HPI otherwise noted to be negative.     PHYSICAL EXAM  BP (!) 147/87   Pulse (!) 110   Temp 98.1 °F (36.7 °C) (Oral)   Resp 18   Ht 6' (1.829 m)   Wt 260 lb (117.9 kg)   SpO2 90%   BMI 35.26 kg/m²    General: Appears uncomfortable. Alert  HEENT: Head atraumatic, Eyes normal inspection, PERRL. Normal ENT inspection, Pharynx normal. No signs of dehydration  NECK: Normal inspection  RESPIRATORY: Scattered wheezes. No chest wall tenderness. No respiratory distress on 2 L nasal cannula  CVS: Heart rate tachycardic and rhythm regular. No Murmurs  ABDOMEN/GI: Soft, moderate left upper quadrant tenderness with mild diffuse tenderness, No distention  BACK: Normal inspection  EXTREMITIES: Non-Tender. Full ROM. Normal appearance. No Pedal edema  NEURO: Alert and oriented. Sensation normal. Motor normal  PSYCH: Mood normal. Affect normal.  SKIN: Color normal.  Erythematous papular rash along the lower abdomen underneath the pannus. Erythematous macular rash of the neck with excoriations. warm, Dry    LABS  I have reviewed all labs for this visit.    Results for orders placed or performed during the hospital encounter of 09/23/22   COVID-19 & Influenza Combo    Specimen: Nasopharyngeal Swab   Result Value Ref Range    SARS-CoV-2 RNA, RT PCR DETECTED (A) NOT DETECTED    INFLUENZA A NOT DETECTED NOT DETECTED    INFLUENZA B NOT DETECTED NOT DETECTED   CBC with Auto Differential   Result Value Ref Range    WBC 19.1 (H) 4.0 - 11.0 K/uL    RBC 5.07 4.20 - 5.90 M/uL    Hemoglobin 15.5 13.5 - 17.5 g/dL    Hematocrit 45.6 40.5 - 52.5 %    MCV 90.1 80.0 - 100.0 fL    MCH 30.5 26.0 - 34.0 pg    MCHC 33.9 31.0 - 36.0 g/dL    RDW 14.2 12.4 - 15.4 %    Platelets 095 (H) 276 - 450 K/uL    MPV 7.9 5.0 - 10.5 fL    Neutrophils % 86.2 %    Lymphocytes % 4.9 %    Monocytes % 6.1 %    Eosinophils % 1.4 %    Basophils % 1.4 %    Neutrophils Absolute 16.4 (H) 1.7 - 7.7 K/uL    Lymphocytes Absolute 0.9 (L) 1.0 - 5.1 K/uL    Monocytes Absolute 1.2 0.0 - 1.3 K/uL    Eosinophils Absolute 0.3 0.0 - 0.6 K/uL    Basophils Absolute 0.3 (H) 0.0 - 0.2 K/uL   CMP w/ Reflex to MG   Result Value Ref Range    Sodium 139 136 - 145 mmol/L    Potassium reflex Magnesium 3.9 3.5 - 5.1 mmol/L    Chloride 103 99 - 110 mmol/L    CO2 20 (L) 21 - 32 mmol/L    Anion Gap 16 3 - 16    Glucose 130 (H) 70 - 99 mg/dL    BUN 29 (H) 7 - 20 mg/dL    Creatinine 1.0 0.9 - 1.3 mg/dL    GFR Non-African American >60 >60    GFR African American >60 >60    Calcium 9.9 8.3 - 10.6 mg/dL    Total Protein 8.8 (H) 6.4 - 8.2 g/dL    Albumin 3.7 3.4 - 5.0 g/dL    Albumin/Globulin Ratio 0.7 (L) 1.1 - 2.2    Total Bilirubin 0.7 0.0 - 1.0 mg/dL    Alkaline Phosphatase 94 40 - 129 U/L    ALT 13 10 - 40 U/L    AST 24 15 - 37 U/L   Lipase   Result Value Ref Range    Lipase 23.0 13.0 - 60.0 U/L   Troponin   Result Value Ref Range    Troponin <0.01 <0.01 ng/mL   Urinalysis with Reflex to Culture    Specimen: Urine   Result Value Ref Range    Color, UA Yellow Straw/Yellow    Clarity, UA Clear Clear    Glucose, Ur Negative Negative mg/dL    Bilirubin Urine SMALL (A) Negative    Ketones, Urine TRACE (A) Negative mg/dL    Specific Gravity, UA 1.025 1.005 - 1.030    Blood, Urine Negative Negative    pH, UA 6.0 5.0 - 8.0    Protein, UA 30 (A) Negative mg/dL    Urobilinogen, Urine 1.0 <2.0 E.U./dL    Nitrite, Urine Negative Negative    Leukocyte Esterase, Urine Negative Negative    Microscopic Examination YES     Urine Type NotGiven     Urine Reflex to Culture Not Indicated    Brain Natriuretic Peptide   Result Value Ref Range    Pro- (H) 0 - 124 pg/mL   Lactate, Sepsis   Result Value Ref Range    Lactic Acid, Sepsis 1.3 0.4 - 1.9 mmol/L   Troponin   Result Value Ref Range    Troponin <0.01 <0.01 ng/mL   Microscopic Urinalysis   Result Value Ref Range    Hyaline Casts, UA 6-10 (A) 0 - 2 /LPF    Coarse Casts, UA 0-2 0 - 2 /LPF    Mucus, UA 4+ (A) None Seen /LPF    WBC, UA 6-9 (A) 0 - 5 /HPF    RBC, UA 3-4 0 - 4 /HPF    Bacteria, UA Rare (A) None Seen /HPF    Amorphous, UA 1+ /HPF   EKG 12 Lead   Result Value Ref Range    Ventricular Rate 108 BPM    Atrial Rate 108 BPM    P-R Interval 156 ms    QRS Duration 84 ms    Q-T Interval 362 ms    QTc Calculation (Bazett) 485 ms    P Axis 38 degrees    R Axis 42 degrees    T Axis 47 degrees    Diagnosis       Sinus tachycardiaPossible Left atrial enlargementCannot rule out Inferior infarct , age undeterminedST & T wave abnormality, consider anterolateral ischemiaAbnormal ECGWhen compared with ECG of 26-MAY-2019 07:12,Minimal criteria for Inferior infarct are now PresentT wave inversion now evident in Anterior leadsQT has lengthened     EKG 12 Lead   Result Value Ref Range    Ventricular Rate 89 BPM    Atrial Rate 89 BPM    P-R Interval 158 ms    QRS Duration 88 ms    Q-T Interval 354 ms    QTc Calculation (Bazett) 430 ms    P Axis 52 degrees    R Axis 44 degrees    T Axis 31 degrees    Diagnosis       Normal sinus rhythmPossible Inferior infarct (cited on or before 23-SEP-2022)T wave abnormality, consider anterolateral ischemiaAbnormal ECGWhen compared with ECG of 23-SEP-2022 09:08, (unconfirmed)QT has shortened       ECG  The Ekg interpreted by me shows  Sinus tachycardia with a rate of 108 bpm.  Normal axis. T wave inversions diffusely. , QRS 84, QTc 45. T wave inversions are new compared to EKG from 5/26/2019  Repeat EKG: Sinus rhythm with a rate of 89 bpm.  Normal axis. T wave inversions have persisted. , QRS 88, QTc 430. RADIOLOGY  CT ABDOMEN PELVIS W IV CONTRAST Additional Contrast? None   Final Result   1. Negative for pulmonary embolus. 2. Multifocal bilateral atypical pneumonia. 3. Mild ileus versus enterocolitis. 4. Improving pelvic adenopathy. CT CHEST PULMONARY EMBOLISM W CONTRAST   Final Result   1. Negative for pulmonary embolus. 2. Multifocal bilateral atypical pneumonia. 3. Mild ileus versus enterocolitis. 4. Improving pelvic adenopathy. XR CHEST PORTABLE   Final Result   Diffuse bilateral airspace disease reflecting pulmonary edema versus   pneumonia. Cardiomegaly.            ED COURSE/MDM  Patient seen and evaluated. Old records reviewed. Labs and imaging reviewed and results discussed with patient. Presenting with generalized illness of nausea and vomiting with cough, as well as chest pain that improved with nitroglycerin earlier. Cardiac work-up obtained. EKG is concerning with T wave inversions, however no depressions or elevations. Given 324 aspirin. Troponin negative. Initial lab work significant for mild metabolic acidosis and leukocytosis. Chest x-ray is concerning for pneumonia. Treated with Rocephin and azithromycin and septic work-up obtained. Given 500 mL bolus of IV fluid. CTPA and CT abdomen pelvis obtained. Shows bilateral atypical pneumonia and enteritis. COVID is positive. Cardiology consulted. Repeat troponin is negative and repeat EKG continues to show T wave inversions. Cardiology evaluate the patient in the ED, they recommend admission for continued evaluation, metoprolol, atorvastatin, lisinopril, nitrates. Patient discussed with hospitalist team, [facility], and agreed to admit to their service. Is this patient to be included in the SEP-1 Core Measure due to severe sepsis or septic shock?    No   Exclusion criteria - the patient is NOT to be included for SEP-1 Core Measure due to:  Viral etiology found or highly suspected (including COVID-19) without concomitant bacterial infection    During the patient's ED course, the patient was given:  Medications   aspirin chewable tablet 81 mg (has no administration in time range)   clopidogrel (PLAVIX) tablet 75 mg (has no administration in time range)   isosorbide mononitrate (IMDUR) extended release tablet 30 mg (has no administration in time range)   metoprolol succinate (TOPROL XL) extended release tablet 25 mg (has no administration in time range)   atorvastatin (LIPITOR) tablet 80 mg (has no administration in time range)   perflutren lipid microspheres (DEFINITY) injection 1.65 mg (has no administration in time range)   aspirin chewable tablet 324 mg (324 mg Oral Given 9/23/22 0929)   HYDROmorphone (DILAUDID) injection 0.5 mg (0.5 mg IntraVENous Given 9/23/22 0929)   cefTRIAXone (ROCEPHIN) 1,000 mg in dextrose 5 % 50 mL IVPB mini-bag (0 mg IntraVENous Stopped 9/23/22 1058)     And   azithromycin (ZITHROMAX) 500 mg in D5W 250ml addavial (500 mg IntraVENous New Bag 9/23/22 1202)   iopamidol (ISOVUE-370) 76 % injection 75 mL (75 mLs IntraVENous Given 9/23/22 1003)   ipratropium-albuterol (DUONEB) nebulizer solution 1 ampule (1 ampule Inhalation Given 9/23/22 1209)   0.9 % sodium chloride bolus (500 mLs IntraVENous New Bag 9/23/22 1200)        CLINICAL IMPRESSION  1. COVID    2. Metabolic acidosis    3. T wave inversion in EKG        Blood pressure 122/80, pulse 90, temperature 98.1 °F (36.7 °C), temperature source Oral, resp. rate 20, height 6' (1.829 m), weight 260 lb (117.9 kg), SpO2 96 %. [address] was admitted in stable condition. Patient was given scripts for the following medications. I counseled patient how to take these medications. New Prescriptions    No medications on file       Follow-up with:  No follow-up provider specified. DISCLAIMER: This chart was created using Dragon dictation software. Efforts were made by me to ensure accuracy, however some errors may be present due to limitations of this technology and occasionally words are not transcribed correctly.      Judith Malagon, DO  09/23/22 [id number]

## 2022-09-23 NOTE — CONSULTS
[address] [place] [address]  [phone]        Reason for Consultation/Chief Complaint: \"I have been having diarrhea and abdominal pain and CP and SOB. \"  Consulted for new T wave inversion with setting of COVID  Last seen Saumya De Leon 6/9/2021    History of Present Illness:  Lyndsay Peña is a 46 y.o. patient who presented to [address] [place] [address] 9/23/2022 with complaints of diarrhea, abd pain, CP, and sob. He has a PMH significant for CAD s/p PCI of the prox RCA 5/25/2019 in-stent thrombosis,  PAD s/p aortobifemoral bypass surgery  (Dr Kwabena Aden 5/28/2019 now followed by Dr. John Enamoardo), HLD, HTN, BILLIE, hx MI, and COPD. Most recent blu-Myoview 6/1/2017 was negative for ischemia with an EF=55%. Most recent Echo 5/11/2020 EF 50-55%; Borderline cLVH. Now presents with c/o cough, congestion, abd pain for over 1 week. Reported 1 episode of CP early this AM; left sided; sharp; no radiation; resolved spontaneously. CXR noted diffuse bilateral ASD reflecting pulmonary edema vs pneumonia and cardiomegaly. Chest/Abd CT noted pneumonia and mild ileus vs enterocolitis. EKG noted  bpm; ST change anterolateral c/w possible ischemia (ST change new from 5/19). LABS: , K+ 3.9, BUN/Cr 29/1.0, , ALT 13, AST 24, H/H 15.5/45. 6. [place] <0.1 x 2. Patient with no c/o SOB, palpitations, dizziness, edema, or orthopnea/PND. I have been asked to provide consultation regarding further management and testing. Past Medical History:   has a past medical history of CAD (coronary artery disease), Cardiomyopathy ([address] COPD (chronic obstructive pulmonary disease) ([address] Hyperlipidemia, Hypertension, MI (myocardial infarction) ([address] BILLIE (obstructive sleep apnea), PAD (peripheral artery disease) ([facility] Pulmonary nodule, S/P coronary artery stent placement, and Vitamin D deficiency. Surgical History:   has a past surgical history that includes Coronary angioplasty with stent (05/06/2012);  Coronary angioplasty with stent (2000's); Coronary angioplasty with stent (05/25/2019); other surgical history (05/28/2019); Abdominal aortic aneurysm repair; and ventral hernia repair (12/2020). Social History:   reports that he has been smoking cigarettes. He has a 20.00 pack-year smoking history. He has never used smokeless tobacco. He reports current alcohol use. He reports that he does not use drugs. Family History:  family history includes Heart Disease in an other family member. He was adopted. Home Medications:  Were reviewed and are listed in nursing record. and/or listed below  Prior to Admission medications    Medication Sig Start Date End Date Taking?  Authorizing Provider   buPROPion (WELLBUTRIN SR) 150 MG extended release tablet TAKE 1 TABLET BY ORAL ROUTE 2 TIMES EVERY DAY IN MORNING THEN 8 HOURS LATER 3/19/21   Historical Provider, MD   B Complex-C-Calcium (B-COMPLEX/VITAMIN C, W/ CA,) TABS TAKE 1 TABLET BY MOUTH DAILY 3/25/22   Historical Provider, MD   vitamin D3 (CHOLECALCIFEROL) 125 MCG (5000 UT) TABS tablet take 2 by Oral route  every day 7/29/20   Historical Provider, MD   clopidogrel (PLAVIX) 75 MG tablet TAKE 1 TABLET (75 MG) BY MOUTH DAILY (ORIGINAL RX WRITTEN BY DR. Yasemin Martines, [facility]) 3/19/21   Historical Provider, MD   diclofenac sodium (VOLTAREN) 1 % GEL apply (4G)  by topical route 4 times every day to the affected area(s) 11/25/20   Historical Provider, MD   methocarbamol (ROBAXIN) 500 MG tablet Take by mouth 3/23/21   Historical Provider, MD   furosemide (LASIX) 40 MG tablet Take by mouth 11/25/20   Historical Provider, MD   oxyCODONE-acetaminophen (PERCOCET) 7.5-325 MG per tablet TAKE 1 TABLET BY ORAL ROUTE  EVERY 12 HOURS AS NEEDED FOR PAIN 4/4/22   Historical Provider, MD   potassium chloride (KLOR-CON SPRINKLE) 10 MEQ extended release capsule Take 1 capsule by mouth 3/19/21   Historical Provider, MD   carvedilol (COREG) 12.5 MG tablet TAKE 1 TABLET BY ORAL ROUTE 2 TIMES EVERY DAY WITH FOOD 3/25/22   Historical Provider, MD   lisinopril (PRINIVIL;ZESTRIL) 40 MG tablet TAKE 1 TABLET (40 MG) BY MOUTH EVERY DAY 3/25/22   Historical Provider, MD   nitroGLYCERIN (NITROSTAT) 0.4 MG SL tablet Place 0.4 mg under the tongue every 5 minutes as needed for Chest pain up to max of 3 total doses. If no relief after 1 dose, call 911.     Historical Provider, MD   Cetirizine HCl (ZYRTEC PO) Take by mouth    Historical Provider, MD   vitamin E 400 UNIT capsule Take 1 capsule by mouth 2 times daily 10/9/20   Bakari Beck MD   aspirin 81 MG chewable tablet Take 1 tablet by mouth daily 5/27/19   Karina Payne MD   atorvastatin (LIPITOR) 80 MG tablet Take 1 tablet by mouth nightly 5/26/19   Karina Payne MD   ticagrelor (BRILINTA) 90 MG TABS tablet Take 1 tablet by mouth 2 times daily 5/26/19   Karina Payne MD   Multiple Vitamins-Minerals (THERAPEUTIC MULTIVITAMIN-MINERALS) tablet Take 1 tablet by mouth daily    Historical Provider, MD   ondansetron (ZOFRAN ODT) 4 MG disintegrating tablet Take 1 tablet by mouth every 8 hours as needed for Nausea or Vomiting 12/24/16   Brooklyn Moreno MD   gabapentin (NEURONTIN) 300 MG capsule Take 300 mg by mouth 3 times daily    Historical Provider, MD        Allergies:  Codeine, Flexeril [cyclobenzaprine hcl], and Vicodin [hydrocodone-acetaminophen]     Review of Systems:   12 point ROS negative in all areas as listed below except as in [place]  Constitutional, EENT, Cardiovascular, pulmonary, GI, , Musculoskeletal, skin, neurological, hematological, endocrine, Psychiatric    Physical Examination:    Vitals:    09/23/22 0907   BP: (!) 147/87   Pulse: (!) 110   Resp: 18   Temp: 98.1 °F (36.7 °C)   SpO2: 90%    Weight: 260 lb (117.9 kg)         General Appearance:  Alert, cooperative, no distress, appears older than stated age   Head:  Normocephalic, without obvious abnormality, atraumatic   Eyes:  PERRL, conjunctiva/corneas clear       Nose: Nares normal, no drainage or sinus tenderness   Throat: Lips, mucosa, and tongue normal   Neck: Supple, symmetrical, trachea midline, no adenopathy, thyroid: not enlarged, symmetric, no tenderness/mass/nodules, no carotid bruit or JVD       Lungs:   Clear to auscultation bilaterally, respirations unlabored   Chest Wall:  No tenderness or deformity   Heart:  Regular rate and rhythm, S1, S2 normal, no murmur, rub or gallop   Abdomen:   Soft, non-tender, bowel sounds active all four quadrants,  no masses, no organomegaly           Extremities: Extremities +scabbed wounds on dorsum of BLE   Pulses: 2+ and symmetric   Skin: Skin color, texture, turgor normal, no rashes or lesions   Pysch: Normal mood and affect   Neurologic: Normal gross motor and sensory exam.         Labs  CBC:   Lab Results   Component Value Date/Time    WBC 19.1 09/23/2022 09:10 AM    RBC 5.07 09/23/2022 09:10 AM    HGB 15.5 09/23/2022 09:10 AM    HCT 45.6 09/23/2022 09:10 AM    MCV 90.1 09/23/2022 09:10 AM    RDW 14.2 09/23/2022 09:10 AM     09/23/2022 09:10 AM     CMP:    Lab Results   Component Value Date/Time     09/23/2022 09:10 AM    K 3.9 09/23/2022 09:10 AM     09/23/2022 09:10 AM    CO2 20 09/23/2022 09:10 AM    BUN 29 09/23/2022 09:10 AM    CREATININE 1.0 09/23/2022 09:10 AM    GFRAA >60 09/23/2022 09:10 AM    GFRAA >60 10/04/2012 07:46 AM    AGRATIO 0.7 09/23/2022 09:10 AM    LABGLOM >60 09/23/2022 09:10 AM    GLUCOSE 130 09/23/2022 09:10 AM    PROT 8.8 09/23/2022 09:10 AM    PROT 7.6 10/04/2012 07:46 AM    CALCIUM 9.9 09/23/2022 09:10 AM    BILITOT 0.7 09/23/2022 09:10 AM    ALKPHOS 94 09/23/2022 09:10 AM    AST 24 09/23/2022 09:10 AM    ALT 13 09/23/2022 09:10 AM     PT/INR:  No results found for: PTINR  Lab Results   Component Value Date    CKTOTAL 134 07/25/2012    TROPONINI <0.01 09/23/2022       EKG:  I have reviewed EKG with the following interpretation:  Impression:  See HPI    Assessment:  Claudio Webber 79. is a 46 y.o. patient who presented to [facility] 9/23/2022 with complaints of diarrhea, abd pain, CP, and sob. He has a PMH significant for CAD s/p PCI of the prox RCA 5/25/2019 in-stent thrombosis,  PAD s/p aortobifemoral bypass surgery  (Dr Ira Fernandez 5/28/2019 now followed by Dr. Luisito Valdez), HLD, HTN, BILLIE, hx MI, and COPD. Most recent blu-Myoview 6/1/2017 was negative for ischemia with an EF=55%. Most recent Echo 5/11/2020 EF 50-55%; Borderline cLVH. Now presents with c/o cough, congestion, abd pain for over 1 week. Reported 1 episode of CP early this AM; left sided; sharp; no radiation; resolved spontaneously. CXR noted diffuse bilateral ASD reflecting pulmonary edema vs pneumonia and cardiomegaly. Chest/Abd CT noted pneumonia and mild ileus vs enterocolitis. EKG noted  bpm; ST change anterolateral c/w possible ischemia (ST change new from 5/19). LABS: , K+ 3.9, BUN/Cr 29/1.0, , ALT 13, AST 24, H/H 15.5/45. 6. [place] <0.1 x 2. Diagnosis of unspecified CP and abnormal EKG finding in middle-aged male with CAD history and Covid infection. Recs:  Ruling out for MI and no current CP. I recommend baby aspirin, beta blocker, statin, and nitrates. I recommend ECHO to evaluate LV function and size, wall motion, and valves for any structural abnormalities. Treat Covid as appropriate    Patient Active Problem List   Diagnosis    Acute inferior myocardial infarction [facility])    CAD (coronary artery disease)    Status post angioplasty with stent    HTN (hypertension)    Trochanteric bursitis    Mediastinal lymphadenopathy    Dyspnea    Tobacco abuse    Daytime somnolence    Obesity    Snoring    Acute transmural inferior wall MI (HCC)    Chronic alcoholism ([address]    Ischemic rest pain of lower extremity    Fatty liver         Thank you for allowing to us to participate in the care or [facility] Further evaluation will be based upon the patient's clinical course and testing results.

## 2022-09-24 PROBLEM — J96.01 ACUTE RESPIRATORY FAILURE WITH HYPOXIA (HCC): Status: ACTIVE | Noted: 2022-09-24

## 2022-09-24 PROBLEM — I95.9 HYPOTENSION: Status: ACTIVE | Noted: 2022-09-24

## 2022-09-24 LAB
ANION GAP SERPL CALCULATED.3IONS-SCNC: 13 MMOL/L (ref 3–16)
BASOPHILS ABSOLUTE: 0 K/UL (ref 0–0.2)
BASOPHILS RELATIVE PERCENT: 0.1 %
BUN BLDV-MCNC: 30 MG/DL (ref 7–20)
C DIFF TOXIN/ANTIGEN: NORMAL
CALCIUM SERPL-MCNC: 9.4 MG/DL (ref 8.3–10.6)
CHLORIDE BLD-SCNC: 103 MMOL/L (ref 99–110)
CO2: 20 MMOL/L (ref 21–32)
CREAT SERPL-MCNC: 1.1 MG/DL (ref 0.9–1.3)
EKG ATRIAL RATE: 56 BPM
EKG DIAGNOSIS: NORMAL
EKG P AXIS: 40 DEGREES
EKG P-R INTERVAL: 168 MS
EKG Q-T INTERVAL: 418 MS
EKG QRS DURATION: 90 MS
EKG QTC CALCULATION (BAZETT): 403 MS
EKG R AXIS: 37 DEGREES
EKG T AXIS: 35 DEGREES
EKG VENTRICULAR RATE: 56 BPM
EOSINOPHILS ABSOLUTE: 0.1 K/UL (ref 0–0.6)
EOSINOPHILS RELATIVE PERCENT: 0.3 %
GFR AFRICAN AMERICAN: >60
GFR NON-AFRICAN AMERICAN: >60
GLUCOSE BLD-MCNC: 151 MG/DL (ref 70–99)
GLUCOSE BLD-MCNC: 152 MG/DL (ref 70–99)
HCT VFR BLD CALC: 42.4 % (ref 40.5–52.5)
HEMOGLOBIN: 14.3 G/DL (ref 13.5–17.5)
LV EF: 55 %
LVEF MODALITY: NORMAL
LYMPHOCYTES ABSOLUTE: 0.4 K/UL (ref 1–5.1)
LYMPHOCYTES RELATIVE PERCENT: 2.1 %
MCH RBC QN AUTO: 30.5 PG (ref 26–34)
MCHC RBC AUTO-ENTMCNC: 33.9 G/DL (ref 31–36)
MCV RBC AUTO: 90 FL (ref 80–100)
MONOCYTES ABSOLUTE: 0.4 K/UL (ref 0–1.3)
MONOCYTES RELATIVE PERCENT: 1.9 %
NEUTROPHILS ABSOLUTE: 17.5 K/UL (ref 1.7–7.7)
NEUTROPHILS RELATIVE PERCENT: 95.6 %
PDW BLD-RTO: 14.5 % (ref 12.4–15.4)
PERFORMED ON: ABNORMAL
PLATELET # BLD: 426 K/UL (ref 135–450)
PMV BLD AUTO: 7.9 FL (ref 5–10.5)
POTASSIUM REFLEX MAGNESIUM: 3.9 MMOL/L (ref 3.5–5.1)
RBC # BLD: 4.71 M/UL (ref 4.2–5.9)
SODIUM BLD-SCNC: 136 MMOL/L (ref 136–145)
TROPONIN: <0.01 NG/ML
WBC # BLD: 18.4 K/UL (ref 4–11)

## 2022-09-24 PROCEDURE — 2580000003 HC RX 258: Performed by: INTERNAL MEDICINE

## 2022-09-24 PROCEDURE — 80048 BASIC METABOLIC PNL TOTAL CA: CPT

## 2022-09-24 PROCEDURE — 94640 AIRWAY INHALATION TREATMENT: CPT

## 2022-09-24 PROCEDURE — 6360000002 HC RX W HCPCS: Performed by: INTERNAL MEDICINE

## 2022-09-24 PROCEDURE — 93005 ELECTROCARDIOGRAM TRACING: CPT

## 2022-09-24 PROCEDURE — 2700000000 HC OXYGEN THERAPY PER DAY

## 2022-09-24 PROCEDURE — 2000000000 HC ICU R&B

## 2022-09-24 PROCEDURE — 2580000003 HC RX 258

## 2022-09-24 PROCEDURE — 2500000003 HC RX 250 WO HCPCS: Performed by: INTERNAL MEDICINE

## 2022-09-24 PROCEDURE — 97162 PT EVAL MOD COMPLEX 30 MIN: CPT

## 2022-09-24 PROCEDURE — 97110 THERAPEUTIC EXERCISES: CPT

## 2022-09-24 PROCEDURE — 94761 N-INVAS EAR/PLS OXIMETRY MLT: CPT

## 2022-09-24 PROCEDURE — 6360000002 HC RX W HCPCS

## 2022-09-24 PROCEDURE — C8929 TTE W OR WO FOL WCON,DOPPLER: HCPCS

## 2022-09-24 PROCEDURE — 97530 THERAPEUTIC ACTIVITIES: CPT

## 2022-09-24 PROCEDURE — 6360000004 HC RX CONTRAST MEDICATION

## 2022-09-24 PROCEDURE — 36415 COLL VENOUS BLD VENIPUNCTURE: CPT

## 2022-09-24 PROCEDURE — 85025 COMPLETE CBC W/AUTO DIFF WBC: CPT

## 2022-09-24 PROCEDURE — 6370000000 HC RX 637 (ALT 250 FOR IP): Performed by: INTERNAL MEDICINE

## 2022-09-24 PROCEDURE — 84484 ASSAY OF TROPONIN QUANT: CPT

## 2022-09-24 PROCEDURE — 99223 1ST HOSP IP/OBS HIGH 75: CPT | Performed by: INTERNAL MEDICINE

## 2022-09-24 PROCEDURE — 93010 ELECTROCARDIOGRAM REPORT: CPT | Performed by: INTERNAL MEDICINE

## 2022-09-24 PROCEDURE — 97166 OT EVAL MOD COMPLEX 45 MIN: CPT

## 2022-09-24 PROCEDURE — 6370000000 HC RX 637 (ALT 250 FOR IP)

## 2022-09-24 PROCEDURE — A4216 STERILE WATER/SALINE, 10 ML: HCPCS | Performed by: INTERNAL MEDICINE

## 2022-09-24 PROCEDURE — 99233 SBSQ HOSP IP/OBS HIGH 50: CPT | Performed by: INTERNAL MEDICINE

## 2022-09-24 RX ORDER — LISINOPRIL 10 MG/1
10 TABLET ORAL DAILY
Status: DISCONTINUED | OUTPATIENT
Start: 2022-09-25 | End: 2022-09-26 | Stop reason: HOSPADM

## 2022-09-24 RX ORDER — DIPHENHYDRAMINE HCL 25 MG
50 TABLET ORAL EVERY 6 HOURS PRN
Status: DISCONTINUED | OUTPATIENT
Start: 2022-09-24 | End: 2022-09-26 | Stop reason: HOSPADM

## 2022-09-24 RX ORDER — CETIRIZINE HYDROCHLORIDE 10 MG/1
10 TABLET ORAL DAILY
Status: DISCONTINUED | OUTPATIENT
Start: 2022-09-24 | End: 2022-09-26 | Stop reason: HOSPADM

## 2022-09-24 RX ADMIN — BUPROPION HYDROCHLORIDE 150 MG: 150 TABLET, EXTENDED RELEASE ORAL at 00:56

## 2022-09-24 RX ADMIN — CETIRIZINE HYDROCHLORIDE 10 MG: 10 TABLET ORAL at 10:50

## 2022-09-24 RX ADMIN — PREGABALIN 200 MG: 100 CAPSULE ORAL at 10:49

## 2022-09-24 RX ADMIN — ONDANSETRON 4 MG: 4 TABLET, ORALLY DISINTEGRATING ORAL at 20:32

## 2022-09-24 RX ADMIN — IPRATROPIUM BROMIDE AND ALBUTEROL 1 PUFF: 20; 100 SPRAY, METERED RESPIRATORY (INHALATION) at 19:49

## 2022-09-24 RX ADMIN — ATORVASTATIN CALCIUM 80 MG: 40 TABLET, FILM COATED ORAL at 00:56

## 2022-09-24 RX ADMIN — CLOPIDOGREL BISULFATE 75 MG: 75 TABLET, FILM COATED ORAL at 10:50

## 2022-09-24 RX ADMIN — Medication 10 ML: at 10:50

## 2022-09-24 RX ADMIN — FAMOTIDINE 20 MG: 10 INJECTION INTRAVENOUS at 20:33

## 2022-09-24 RX ADMIN — ONDANSETRON HYDROCHLORIDE 4 MG: 2 INJECTION, SOLUTION INTRAMUSCULAR; INTRAVENOUS at 00:24

## 2022-09-24 RX ADMIN — BUPROPION HYDROCHLORIDE 150 MG: 150 TABLET, EXTENDED RELEASE ORAL at 10:50

## 2022-09-24 RX ADMIN — DEXAMETHASONE 6 MG: 4 TABLET ORAL at 13:02

## 2022-09-24 RX ADMIN — SODIUM CHLORIDE: 9 INJECTION, SOLUTION INTRAVENOUS at 13:04

## 2022-09-24 RX ADMIN — SODIUM CHLORIDE: 9 INJECTION, SOLUTION INTRAVENOUS at 06:45

## 2022-09-24 RX ADMIN — BUPROPION HYDROCHLORIDE 150 MG: 150 TABLET, EXTENDED RELEASE ORAL at 20:32

## 2022-09-24 RX ADMIN — FAMOTIDINE 20 MG: 10 INJECTION INTRAVENOUS at 10:50

## 2022-09-24 RX ADMIN — PREGABALIN 200 MG: 100 CAPSULE ORAL at 00:56

## 2022-09-24 RX ADMIN — ATORVASTATIN CALCIUM 80 MG: 40 TABLET, FILM COATED ORAL at 20:32

## 2022-09-24 RX ADMIN — GUAIFENESIN AND DEXTROMETHORPHAN 5 ML: 100; 10 SYRUP ORAL at 00:57

## 2022-09-24 RX ADMIN — PERFLUTREN 1.65 MG: 6.52 INJECTION, SUSPENSION INTRAVENOUS at 08:39

## 2022-09-24 RX ADMIN — LISINOPRIL 40 MG: 20 TABLET ORAL at 00:56

## 2022-09-24 RX ADMIN — OXYCODONE HYDROCHLORIDE AND ACETAMINOPHEN 1 TABLET: 7.5; 325 TABLET ORAL at 20:32

## 2022-09-24 RX ADMIN — ENOXAPARIN SODIUM 30 MG: 100 INJECTION SUBCUTANEOUS at 20:33

## 2022-09-24 RX ADMIN — Medication 10 ML: at 20:33

## 2022-09-24 RX ADMIN — EPINEPHRINE 0.3 MG: 1 INJECTION, SOLUTION, CONCENTRATE INTRAVENOUS at 00:15

## 2022-09-24 RX ADMIN — OXYCODONE HYDROCHLORIDE AND ACETAMINOPHEN 1 TABLET: 7.5; 325 TABLET ORAL at 00:59

## 2022-09-24 RX ADMIN — ASPIRIN 81 MG: 81 TABLET, CHEWABLE ORAL at 10:50

## 2022-09-24 RX ADMIN — ENOXAPARIN SODIUM 30 MG: 100 INJECTION SUBCUTANEOUS at 10:50

## 2022-09-24 RX ADMIN — PREGABALIN 200 MG: 100 CAPSULE ORAL at 20:33

## 2022-09-24 RX ADMIN — ENOXAPARIN SODIUM 30 MG: 100 INJECTION SUBCUTANEOUS at 00:56

## 2022-09-24 ASSESSMENT — PAIN DESCRIPTION - LOCATION
LOCATION: ABDOMEN
LOCATION: ABDOMEN;BACK
LOCATION: ABDOMEN;BACK

## 2022-09-24 ASSESSMENT — PAIN SCALES - GENERAL
PAINLEVEL_OUTOF10: 10
PAINLEVEL_OUTOF10: 10
PAINLEVEL_OUTOF10: 6
PAINLEVEL_OUTOF10: 0

## 2022-09-24 ASSESSMENT — PAIN DESCRIPTION - ORIENTATION
ORIENTATION: MID
ORIENTATION: MID

## 2022-09-24 ASSESSMENT — PAIN DESCRIPTION - DESCRIPTORS
DESCRIPTORS: ACHING;STABBING;PRESSURE
DESCRIPTORS: ACHING;SHARP
DESCRIPTORS: DISCOMFORT

## 2022-09-24 NOTE — CONSULTS
Reason for referral and CC: COVI 19 pneumonia    HISTORY OF PRESENT ILLNESS: 47 yo male with a h/o COPD, CAD, CM with EF 40%, tobacco abuse presented with a c/o general malaise x 2 weeks and abd pain for 2 days with diarrhea. No emesis. + MERCEDES. Cough x 2 weeks. CP once but has not recurred. No fever. Still smoking. Past Medical History:   Diagnosis Date    CAD (coronary artery disease)     Cardiomyopathy ([address] 05/25/2019    EF= 40%     COPD (chronic obstructive pulmonary disease) ([facility])     Hx of blood clots     Hyperlipidemia     Hypertension     MI (myocardial infarction) ([facility])     BILLIE (obstructive sleep apnea)     PAD (peripheral artery disease) ([facility])     Pulmonary nodule     S/P coronary artery stent placement     Vitamin D deficiency      Past Surgical History:   Procedure Laterality Date    ABDOMINAL AORTIC ANEURYSM REPAIR      CORONARY ANGIOPLASTY WITH STENT PLACEMENT  05/06/2012    [place]: [place]    CORONARY ANGIOPLASTY WITH STENT PLACEMENT  2000's    x 2    CORONARY ANGIOPLASTY WITH STENT PLACEMENT  05/25/2019    [place]: [place]- [facility]    OTHER SURGICAL HISTORY  05/28/2019    Bilateral Femerol Stenting and angioplasty with Dr. Anderson Necessary  12/2020     Family History  family history includes Heart Disease in an other family member. He was adopted. Social History:  reports that he has been smoking cigarettes. He has a 20.00 pack-year smoking history. He has never used smokeless tobacco.   reports current alcohol use. ALLERGIES:  Patient is allergic to codeine and flexeril [cyclobenzaprine hcl].   Continuous Infusions:   sodium chloride      sodium chloride 75 mL/hr at 09/24/22 0645     Scheduled Meds:   cetirizine  10 mg Oral Daily    [START ON 9/25/2022] lisinopril  10 mg Oral Daily    aspirin  81 mg Oral Daily    clopidogrel  75 mg Oral Daily    metoprolol succinate  25 mg Oral Daily    atorvastatin  80 mg Oral Nightly    buPROPion  150 mg Oral BID pregabalin  200 mg Oral TID    sodium chloride flush  5-40 mL IntraVENous 2 times per day    enoxaparin  30 mg SubCUTAneous BID    permethrin   Topical Once    famotidine (PEPCID) injection  20 mg IntraVENous BID     PRN Meds:  diphenhydrAMINE, oxyCODONE-acetaminophen, sodium chloride flush, sodium chloride, potassium chloride **OR** potassium alternative oral replacement **OR** potassium chloride, magnesium sulfate, ondansetron **OR** ondansetron, polyethylene glycol, acetaminophen **OR** acetaminophen, guaiFENesin-dextromethorphan, albuterol-ipratropium    REVIEW OF SYSTEMS:  Constitutional: Negative for fever  HENT: Negative for sore throat  Eyes: Negative for redness   Respiratory: Negative for dyspnea, cough  Cardiovascular: Negative for chest pain  Gastrointestinal: Negative for vomiting, diarrhea   Genitourinary: Negative for hematuria   Musculoskeletal: Negative for arthralgias   Skin: Negative for rash  Neurological: Negative for syncope  Hematological: Negative for adenopathy  Psychiatric/Behavorial: Negative for anxiety    PHYSICAL EXAM: BP 99/65   Pulse 87   Temp 96.8 °F (36 °C) (Axillary)   Resp 19   Ht 6' (1.829 m)   Wt 252 lb 12.8 oz (114.7 kg)   SpO2 97%   BMI 34.29 kg/m²  on 2 L  Constitutional:  No acute distress. Eyes: PERRL. Conjunctivae anicteric. ENT: Normal nose. Normal tongue. Neck:  Trachea is midline. No thyroid tenderness. Respiratory: No accessory muscle usage. decreased breath sounds. + wheezes. No rales. No Rhonchi. Cardiovascular: Normal S1S2. No digit clubbing. No digit cyanosis. No LE edema. Gastrointestinal: No mass palpated. No tenderness palpated. No umbilical hernia. Lymphatic: No cervical or supraclavicular adenopathy. Skin: No rash on the exposed extremities. No Nodules or induration on exposed extremities. Psychiatric: No anxiety or Agitation. Alert and Oriented to person, place and time.     CBC:   Recent Labs     09/23/22  0910 09/24/22  0506   WBC 19.1* 18.4*   HGB 15.5 14.3   HCT 45.6 42.4   MCV 90.1 90.0   * 426     BMP:   Recent Labs     09/23/22  0910 09/24/22  0506    136   K 3.9 3.9    103   CO2 20* 20*   BUN 29* 30*   CREATININE 1.0 1.1        Recent Labs     09/23/22  0910   AST 24   ALT 13   LIPASE 23.0   BILITOT 0.7   ALKPHOS 94     No results for input(s): PROTIME, INR in the last 72 hours. Recent Labs     09/23/22  1208   COLORU Yellow   PHUR 6.0   WBCUA 6-9*   RBCUA 3-4   MUCUS 4+*   BACTERIA Rare*   CLARITYU Clear   SPECGRAV 1.025   LEUKOCYTESUR Negative   UROBILINOGEN 1.0   BILIRUBINUR SMALL*   BLOODU Negative   GLUCOSEU Negative   AMORPHOUS 1+     No results for input(s): PHART, IWC3DAK, PO2ART in the last 72 hours. COVID detected  Influenza not detected   Stool studies pending     RADIOLOGY  CT ABDOMEN PELVIS W IV CONTRAST Additional Contrast? None   Final Result   1. Negative for pulmonary embolus. 2. Multifocal bilateral atypical pneumonia. 3. Mild ileus versus enterocolitis. 4. Improving pelvic adenopathy. CT CHEST PULMONARY EMBOLISM W CONTRAST   Final Result   1. Negative for pulmonary embolus. 2. Multifocal bilateral atypical pneumonia. 3. Mild ileus versus enterocolitis. 4. Improving pelvic adenopathy.        ASSESSMENT:  Acute hypoxic respiratory failure  COVID 19 pneumonia  COPD not on home O2  Colitis vs mild ileus  Tobacco abuse  CP -resolved  CAD with PCI  Cardiomyopathy with EF 40%  Anaphylaxis to cipro or flagyl -resolved    PLAN:  Droplet Plus Airborne Precautions   Supplemental oxygen to keep saturation greater than 92%  Combivent  Start Decadron 6 mg QD, D#1, monitor glucose closely  GI and cardiology have seen  Lovenox twice daily   Ok to leave the ICU    Thank you Chon Doss MD for this consult

## 2022-09-24 NOTE — PROGRESS NOTES
09/24/22 1900   RT Protocol   History Pulmonary Disease 2   Respiratory pattern 2   Breath sounds 2   Cough 0   Indications for Bronchodilator Therapy Decreased or absent breath sounds   Bronchodilator Assessment Score 6   RT Inhaler-Nebulizer Bronchodilator Protocol Note    There is a bronchodilator order in the chart from a provider indicating to follow the RT Bronchodilator Protocol and there is an Initiate RT Inhaler-Nebulizer Bronchodilator Protocol order as well (see protocol at bottom of note). CXR Findings:  XR CHEST PORTABLE    Result Date: 9/23/2022  Diffuse bilateral airspace disease reflecting pulmonary edema versus pneumonia. Cardiomegaly. The findings from the last RT Protocol Assessment were as follows:   History Pulmonary Disease: Chronic pulmonary disease  Respiratory Pattern: Dyspnea on exertion or RR 21-25 bpm  Breath Sounds: Slightly diminished and/or crackles  Cough: Strong, spontaneous, non-productive  Indication for Bronchodilator Therapy: Decreased or absent breath sounds  Bronchodilator Assessment Score: 6    Aerosolized bronchodilator medication orders have been revised according to the RT Inhaler-Nebulizer Bronchodilator Protocol below. Respiratory Therapist to perform RT Therapy Protocol Assessment initially then follow the protocol. Repeat RT Therapy Protocol Assessment PRN for score 0-3 or on second treatment, BID, and PRN for scores above 3. No Indications - adjust the frequency to every 6 hours PRN wheezing or bronchospasm, if no treatments needed after 48 hours then discontinue using Per Protocol order mode. If indication present, adjust the RT bronchodilator orders based on the Bronchodilator Assessment Score as indicated below.   Use Inhaler orders unless patient has one or more of the following: on home nebulizer, not able to hold breath for 10 seconds, is not alert and oriented, cannot activate and use MDI correctly, or respiratory rate 25 breaths per minute or more, then use the equivalent nebulizer order(s) with same Frequency and PRN reasons based on the score. If a patient is on this medication at home then do not decrease Frequency below that used at home. 0-3 - enter or revise RT bronchodilator order(s) to equivalent RT Bronchodilator order with Frequency of every 4 hours PRN for wheezing or increased work of breathing using Per Protocol order mode. 4-6 - enter or revise RT Bronchodilator order(s) to two equivalent RT bronchodilator orders with one order with BID Frequency and one order with Frequency of every 4 hours PRN wheezing or increased work of breathing using Per Protocol order mode. 7-10 - enter or revise RT Bronchodilator order(s) to two equivalent RT bronchodilator orders with one order with TID Frequency and one order with Frequency of every 4 hours PRN wheezing or increased work of breathing using Per Protocol order mode. 11-13 - enter or revise RT Bronchodilator order(s) to one equivalent RT bronchodilator order with QID Frequency and an Albuterol order with Frequency of every 4 hours PRN wheezing or increased work of breathing using Per Protocol order mode. Greater than 13 - enter or revise RT Bronchodilator order(s) to one equivalent RT bronchodilator order with every 4 hours Frequency and an Albuterol order with Frequency of every 2 hours PRN wheezing or increased work of breathing using Per Protocol order mode.        Electronically signed by Mahi Zamora RCP on 9/24/2022 at 7:53 PM

## 2022-09-24 NOTE — PROGRESS NOTES
Hospitalist Progress Note      PCP: PHANI Cisse - CNP    Date of Admission: 9/23/2022    Chief Complaint: The patient is a 46 y.o. male with pmhx of CAD, COPD, HTN, HLD, BILLIE, PAD who presented to [address] [place] [address] ED with complaint of generally not feeling well for the past 2 weeks. His main complaint is his abdominal pain that has progressively gotten worse since Friday. Pain is diffuse sharp stabbing and cramping. He has also had associated diarrhea, multiple episodes daily. No blood or black stool        He was started on cipro and flagyl empiric and developed skin rash and lip and eye lid swelling and was transferred to ICU last night and given benadryl and solumedrol. Subjective:     Feels much better this am. Skin rash and swelling essentially resolved.          Medications:  Reviewed    Infusion Medications    sodium chloride      sodium chloride 75 mL/hr at 09/24/22 1304     Scheduled Medications    cetirizine  10 mg Oral Daily    [START ON 9/25/2022] lisinopril  10 mg Oral Daily    dexamethasone  6 mg Oral Daily    albuterol-ipratropium  1 puff Inhalation Q6H    aspirin  81 mg Oral Daily    clopidogrel  75 mg Oral Daily    metoprolol succinate  25 mg Oral Daily    atorvastatin  80 mg Oral Nightly    buPROPion  150 mg Oral BID    pregabalin  200 mg Oral TID    sodium chloride flush  5-40 mL IntraVENous 2 times per day    enoxaparin  30 mg SubCUTAneous BID    permethrin   Topical Once    famotidine (PEPCID) injection  20 mg IntraVENous BID     PRN Meds: diphenhydrAMINE, oxyCODONE-acetaminophen, sodium chloride flush, sodium chloride, potassium chloride **OR** potassium alternative oral replacement **OR** potassium chloride, magnesium sulfate, ondansetron **OR** ondansetron, polyethylene glycol, acetaminophen **OR** acetaminophen, guaiFENesin-dextromethorphan, albuterol-ipratropium      Intake/Output Summary (Last 24 hours) at 9/24/2022 1331  Last data filed at 9/24/2022 1304  Gross per 24 hour   Intake 1043 ml   Output --   Net 1043 ml       Physical Exam Performed:    BP 97/71   Pulse 74   Temp 96.8 °F (36 °C) (Axillary)   Resp 16   Ht 6' (1.829 m)   Wt 252 lb 12.8 oz (114.7 kg)   SpO2 95%   BMI 34.29 kg/m²     Gen: No distress. Alert. ++very unkept dissheveled, +chronically ill appearing   Eyes: PERRL. No sclera icterus. No conjunctival injection. Neck: No JVD. No Carotid Bruit. Trachea midline. Resp: No accessory muscle use. No crackles. +diminished breath sounds with rhonchi and wheezing throughout, some labored breathing, currently on RA sating 92%  CV: Regular rate. Regular rhythm. No murmur. No rub. No edema. Peripheral Pulses: cool extremities and very weak peripheral pulses bilaterally   GI: No masses. No organomegaly. No hernia. +hyperactive bowel sounds, +distended but soft, diffuse tenderness  Skin: Warm and dry. No nodule on exposed extremities. +erythematous bites on nape of neck, scattered bites on scalp and behind the ears bilaterally, +right leg with chronic wound with crusting and erythema,+erythema in panus area   M/S: No cyanosis. No joint deformity. No clubbing. Neuro: Awake. Grossly nonfocal    Psych: Oriented x 3. No anxiety or agitation. Labs:   Recent Labs     09/23/22  0910 09/24/22  0506   WBC 19.1* 18.4*   HGB 15.5 14.3   HCT 45.6 42.4   * 426     Recent Labs     09/23/22  0910 09/24/22  0506    136   K 3.9 3.9    103   CO2 20* 20*   BUN 29* 30*   CREATININE 1.0 1.1   CALCIUM 9.9 9.4     Recent Labs     09/23/22  0910   AST 24   ALT 13   BILITOT 0.7   ALKPHOS 94     No results for input(s): INR in the last 72 hours.   Recent Labs     09/23/22 2039 09/23/22  2305 09/24/22  0158   TROPONINI <0.01 <0.01 <0.01       Urinalysis:      Lab Results   Component Value Date/Time    NITRU Negative 09/23/2022 12:08 PM    WBCUA 6-9 09/23/2022 12:08 PM    BACTERIA Rare 09/23/2022 12:08 PM    RBCUA 3-4 09/23/2022 12:08 PM    BLOODU Negative

## 2022-09-24 NOTE — PROGRESS NOTES
RT Inhaler-Nebulizer Bronchodilator Protocol Note    There is a bronchodilator order in the chart from a provider indicating to follow the RT Bronchodilator Protocol and there is an Initiate RT Inhaler-Nebulizer Bronchodilator Protocol order as well (see protocol at bottom of note). CXR Findings:  XR CHEST PORTABLE    Result Date: 9/23/2022  Diffuse bilateral airspace disease reflecting pulmonary edema versus pneumonia. Cardiomegaly. The findings from the last RT Protocol Assessment were as follows:   History Pulmonary Disease: (P) Chronic pulmonary disease  Respiratory Pattern: (P) Dyspnea on exertion or RR 21-25 bpm  Breath Sounds: (P) Slightly diminished and/or crackles  Cough: (P) Strong, spontaneous, non-productive  Indication for Bronchodilator Therapy: (P) Decreased or absent breath sounds, On home bronchodilators  Bronchodilator Assessment Score: (P) 6    Aerosolized bronchodilator medication orders have been revised according to the RT Inhaler-Nebulizer Bronchodilator Protocol below. Respiratory Therapist to perform RT Therapy Protocol Assessment initially then follow the protocol. Repeat RT Therapy Protocol Assessment PRN for score 0-3 or on second treatment, BID, and PRN for scores above 3. No Indications - adjust the frequency to every 6 hours PRN wheezing or bronchospasm, if no treatments needed after 48 hours then discontinue using Per Protocol order mode. If indication present, adjust the RT bronchodilator orders based on the Bronchodilator Assessment Score as indicated below. Use Inhaler orders unless patient has one or more of the following: on home nebulizer, not able to hold breath for 10 seconds, is not alert and oriented, cannot activate and use MDI correctly, or respiratory rate 25 breaths per minute or more, then use the equivalent nebulizer order(s) with same Frequency and PRN reasons based on the score.   If a patient is on this medication at home then do not decrease Frequency below that used at home. 0-3 - enter or revise RT bronchodilator order(s) to equivalent RT Bronchodilator order with Frequency of every 4 hours PRN for wheezing or increased work of breathing using Per Protocol order mode. 4-6 - enter or revise RT Bronchodilator order(s) to two equivalent RT bronchodilator orders with one order with BID Frequency and one order with Frequency of every 4 hours PRN wheezing or increased work of breathing using Per Protocol order mode. 7-10 - enter or revise RT Bronchodilator order(s) to two equivalent RT bronchodilator orders with one order with TID Frequency and one order with Frequency of every 4 hours PRN wheezing or increased work of breathing using Per Protocol order mode. 11-13 - enter or revise RT Bronchodilator order(s) to one equivalent RT bronchodilator order with QID Frequency and an Albuterol order with Frequency of every 4 hours PRN wheezing or increased work of breathing using Per Protocol order mode. Greater than 13 - enter or revise RT Bronchodilator order(s) to one equivalent RT bronchodilator order with every 4 hours Frequency and an Albuterol order with Frequency of every 2 hours PRN wheezing or increased work of breathing using Per Protocol order mode.      Home reg  Electronically signed by Tony Still RCP on 9/24/2022 at 3:51 PM

## 2022-09-24 NOTE — PROGRESS NOTES
[address]   Progress Note  Cardiology      HPI: Seeing Mr. Sommers today for f/u abnormal EKG finding. He reports feeling better and denies specific complaints. Tele reviewed NSR 65-70bpm        Physical Examination:    Vitals:    09/24/22 0600   BP: 86/60   Pulse: 70   Resp: 14   Temp:    SpO2: 95%          Constitutional and General Appearance: NAD   Respiratory:  Normal excursion and expansion without use of accessory muscles  Resp Auscultation: Normal breath sounds without dullness  Cardiovascular: The apical impulses not displaced  Heart tones are crisp and normal  Cervical veins are not engorged  The carotid upstroke is normal in amplitude and contour without delay or bruit  Normal S1S2, No S3, No Murmur  Peripheral pulses are symmetrical and full  There is no clubbing, cyanosis of the extremities. No edema  Pedal Pulses: 2+ and equal   Abdomen:  No masses or tenderness  Liver/Spleen: No Abnormalities Noted  Neurological/Psychiatric:  Alert and oriented in all spheres  Moves all extremities well  No abnormalities of mood, affect, memory, mentation, or behavior are noted  Skin: warm and dry        Lab Results   Component Value Date    WBC 18.4 (H) 09/24/2022    HGB 14.3 09/24/2022    HCT 42.4 09/24/2022    MCV 90.0 09/24/2022     09/24/2022     Lab Results   Component Value Date    CREATININE 1.1 09/24/2022    BUN 30 (H) 09/24/2022     09/24/2022    K 3.9 09/24/2022     09/24/2022    CO2 20 (L) 09/24/2022     Lab Results   Component Value Date    INR 1.18 (H) 05/28/2019    PROTIME 13.4 (H) 05/28/2019             Assessment:  Anum Tran is a 46 y.o. patient who presented to [facility] 9/23/2022 with complaints of diarrhea, abd pain, CP, and sob. He has a PMH significant for CAD s/p PCI of the prox RCA 5/25/2019 in-stent thrombosis,  PAD s/p aortobifemoral bypass surgery  (Dr Abi Styles 5/28/2019 now followed by Dr. Benito Murray), HLD, HTN, BILLIE, hx MI, and COPD.    Most recent blu-Myoview 6/1/2017 was negative for ischemia with an EF=55%. Most recent Echo 5/11/2020 EF 50-55%; Borderline cLVH. Now presents with c/o cough, congestion, abd pain for over 1 week. Reported 1 episode of CP early this AM; left sided; sharp; no radiation; resolved spontaneously. CXR noted diffuse bilateral ASD reflecting pulmonary edema vs pneumonia and cardiomegaly. Chest/Abd CT noted pneumonia and mild ileus vs enterocolitis. EKG noted  bpm; ST change anterolateral c/w possible ischemia (ST change new from 5/19). LABS: , K+ 3.9, BUN/Cr 29/1.0, , ALT 13, AST 24, H/H 15.5/45. 6. Chester <0.1 x 2. Diagnosis of unspecified CP and abnormal EKG finding in middle-aged male with CAD history and Covid infection. Recs:  Ruled out for MI and no current CP. Cont  asa 81mg qd, lipitor 80mg qd, plavix 75mg qd,  lisinopril 10mg qd, toprol XL 25mg qd. BP low and will d/c imdur and cut back on lisinopril dosing and add BP parameters. Pending ECHO to evaluate LV function and size, wall motion, and valves for any structural abnormalities. Treat Covid as appropriate  I reviewed 2 subsequent EKG's which have improving ST changes. Most recent with mild T inversion anterior leads and lateral ST changes resolved.     Patient Active Problem List   Diagnosis    Acute inferior myocardial infarction [facility])    CAD (coronary artery disease)    Status post angioplasty with stent    HTN (hypertension)    Trochanteric bursitis    Mediastinal lymphadenopathy    Dyspnea    Tobacco abuse    Daytime somnolence    Obesity    Snoring    Acute transmural inferior wall MI (HCC)    Chronic alcoholism (HCC)    Ischemic rest pain of lower extremity    Fatty liver    Chest pain    T wave inversion in EKG    Lice infestation    COVID    HLD (hyperlipidemia)    PAD (peripheral artery disease) (HCC)    Enterocolitis    Abnormal finding on EKG

## 2022-09-24 NOTE — PROGRESS NOTES
Pt received into ICU-12. Report received from PCU RN. Pt has visible hives to groin, chest and underarms. Dr Collier Lack in to see pt. Pt given Chlorhexidine bath. Wound pictures taken.

## 2022-09-24 NOTE — PROGRESS NOTES
Comprehensive Nutrition Assessment    Type and Reason for Visit:  Initial, Wound, Positive Nutrition Screen    Nutrition Recommendations/Plan:   Continue Reg diet  Added Ensure HP TID      Malnutrition Assessment:  Malnutrition Status: At risk   Nutrition Assessment:    Pt. nutritionally compromised AEB admitted for abdominal pain x 2 weeks + N/v/D has noted weight loss ~ 27# since April 2022 unknown if this was intentional or not . At risk for further nutrition compromise r/t + Covid and has 2 stage 2 pressure areas on buttocks and heels. Will add ensure HP TID . Nutrition Related Findings:    + N/VD PTA; feeling better and tolerationg Reg diet currently Wound Type: Multiple, Pressure Injury, Venous Stasis, Stage II       Current Nutrition Intake & Therapies:    Average Meal Intake: Unable to assess  Average Supplements Intake: None Ordered  ADULT DIET; Regular; No Caffeine    Anthropometric Measures:  Height: 6' (182.9 cm)  Ideal Body Weight (IBW): 178 lbs (81 kg)    Admission Body Weight: 252 lb (114.3 kg)  Current Body Weight: 252 lb 12.8 oz (114.7 kg), 142 % IBW.  Weight Source: Bed Scale  Current BMI (kg/m2): 34.3  Usual Body Weight: 279 lb (126.6 kg) (April 2022)  % Weight Change (Calculated): -9.4                         Estimated Daily Nutrient Needs:  Energy Requirements Based On: Kcal/kg     Energy (kcal/day): 5427-9148 based 15-18 kcal/kg cbw  Weight Used for Protein Requirements: Ideal  Protein (g/day):  based ~ 1.2-1.14 gr/kg ibw  Method Used for Fluid Requirements: 1 ml/kcal  Fluid (ml/day): 4147-2278    Nutrition Diagnosis:   Increased nutrient needs related to catabolic illness, altered GI function as evidenced by wounds, weight loss, nausea, vomiting, diarrhea, GI abnormality    Nutrition Interventions:   Food and/or Nutrient Delivery: Continue Current Diet, Start Oral Nutrition Supplement  Nutrition Education/Counseling: No recommendation at this time  Coordination of Nutrition Care: Continue to monitor while inpatient       Goals:     Goals: PO intake 75% or greater, by next RD assessment       Nutrition Monitoring and Evaluation:   Behavioral-Environmental Outcomes: None Identified  Food/Nutrient Intake Outcomes: Food and Nutrient Intake, Supplement Intake  Physical Signs/Symptoms Outcomes: Nausea or Vomiting, Weight, Biochemical Data, Skin    Discharge Planning:     Too soon to determine     Deirdre Phelan, [address], [place]  Contact: [phone]

## 2022-09-24 NOTE — H&P
Inpatient Occupational Therapy  Evaluation and Treatment    Unit: ICU  Date:  9/24/2022  Patient Name:    Arnold Sotelo  Admitting diagnosis:  Metabolic acidosis [R69.8]  Chest pain [R07.9]  T wave inversion in EKG [R94.31]  COVID [U07.1]  Admit Date:  9/23/2022  Precautions/Restrictions/WB Status/ Lines/ Wounds/ Oxygen: Fall risk, Bed/chair alarm, Lines -IV and Purewick catheter, Telemetry, Continuous pulse oximetry, and Isolation Precautions: Droplet Plus - COVID, monitor BP. Treatment Time:  7357-0832  Treatment Number: 1     Billable Treatment Time: 34 minutes   Total Treatment Time:   44   minutes    Patient Goals for Therapy:  No goals stated      Discharge Recommendations: Continue to assess  DME needs for discharge: Continue to assess. Therapy recommendations for staff:   Assist of 1 with bed level activities- continue to assess when medially stable. History of Present Illness:  [place]: \"The patient is a 46 y.o. male with pmhx of CAD, COPD, HTN, HLD, BILLIE, PAD who presented to [facility] ED with complaint of generally not feeling well for the past 2 weeks. His main complaint is his abdominal pain that has progressively gotten worse since Friday. Pain is diffuse sharp stabbing and cramping. He has also had associated diarrhea, multiple episodes daily. No blood or black stool. No pain with bowel movements. He denies any vomiting but has felt nauseous, and has not been able to eat or drink much lately 2/2 to poor appetite and pain. Feels like his abdomen has become more distended. He is also complaining of vague symptoms of fatigue, weakness, light-headedness and shortness of breath with exertion. No dizziness or syncope. No falls at home. He has had a productive cough with green white sputum for 2 weeks that has progressively gotten better but is still present. Had on episode of sharp left sided chest pain that self terminated and is not present currently. No fever or chills.  No hematuria or dysuria. His daughter are also sick with similar symptoms. He does smoke cigarettes. Does not wear oxygen at home. \"     [place]:  \"Chief Complaint: The patient is a 46 y.o. male with pmhx of CAD, COPD, HTN, HLD, BILLIE, PAD who presented to [facility] ED with complaint of generally not feeling well for the past 2 weeks. His main complaint is his abdominal pain that has progressively gotten worse since Friday. Pain is diffuse sharp stabbing and cramping. He has also had associated diarrhea, multiple episodes daily. No blood or black stool           He was started on cipro and flagyl empiric and developed skin rash and lip and eye lid swelling and was transferred to ICU last night and given benadryl and solumedrol. \"        Per Dr. Mini Stewart: \"Assessment:  Meer Miller is a 46 y.o. patient who presented to [facility] 9/23/2022 with complaints of diarrhea, abd pain, CP, and sob. He has a PMH significant for CAD s/p PCI of the prox RCA 5/25/2019 in-stent thrombosis,  PAD s/p aortobifemoral bypass surgery  (Dr Peter Bear 5/28/2019 now followed by Dr. Silas Mcgowan), HLD, HTN, BILLIE, hx MI, and COPD. Most recent blu-Myoview 6/1/2017 was negative for ischemia with an EF=55%. Most recent Echo 5/11/2020 EF 50-55%; Borderline cLVH. Now presents with c/o cough, congestion, abd pain for over 1 week. Reported 1 episode of CP early this AM; left sided; sharp; no radiation; resolved spontaneously. CXR noted diffuse bilateral ASD reflecting pulmonary edema vs pneumonia and cardiomegaly. Chest/Abd CT noted pneumonia and mild ileus vs enterocolitis. EKG noted  bpm; ST change anterolateral c/w possible ischemia (ST change new from 5/19). LABS: , K+ 3.9, BUN/Cr 29/1.0, , ALT 13, AST 24, H/H 15.5/45. 6. Chester <0.1 x 2. Diagnosis of unspecified CP and abnormal EKG finding in middle-aged male with CAD history and Covid infection. Recs:  Ruling out for MI and no current CP.   I recommend baby aspirin, beta blocker, statin, and nitrates. I recommend ECHO to evaluate LV function and size, wall motion, and valves for any structural abnormalities. Treat Covid as appropriate\"     Radiology:  CT ABDOMEN PELVIS W IV CONTRAST Additional Contrast? None   Final Result   1. Negative for pulmonary embolus. 2. Multifocal bilateral atypical pneumonia. 3. Mild ileus versus enterocolitis. 4. Improving pelvic adenopathy. CT CHEST PULMONARY EMBOLISM W CONTRAST   Final Result   1. Negative for pulmonary embolus. 2. Multifocal bilateral atypical pneumonia. 3. Mild ileus versus enterocolitis. 4. Improving pelvic adenopathy. XR CHEST PORTABLE   Final Result   Diffuse bilateral airspace disease reflecting pulmonary edema versus   pneumonia. Cardiomegaly. Home Health S4 Level Recommendation:  NA  AM-PAC Score: AM-PAC Inpatient Daily Activity Raw Score: 14    Preadmission Environment    Pt. Lives Alone  Home environment:    apartment   Steps to enter first floor: No steps  Steps to second floor: N/A  Bathroom: walk in shower, grab bars, standard height commode, and shower chair without back  Equipment owned:  none     Preadmission Status:  Pt. Able to drive: Yes  Pt Fully independent with ADLs: Yes  Pt sleeps in recliner  Pt. Required assistance from family for: Independent PTA  Pt. independent for transfers and gait and walked with No Device  History of falls No    Subjective  Patient lying supine in bed with no family present   Pt agreeable to this OT eval & tx. Cognition    A&O Person, Place, Time, and Situation   Able to follow 2 step commands    Pain  No    Upper Extremity ROM:    WFL,  pt able to perform all bed mobility, transfers, and gait without ROM limitation.     Upper Extremity Strength:    BUE strength WFL, but not formally assessed w/ MMT      Upper Extremity Sensation    WNL    Upper Extremity Proprioception:  WNL    Coordination and Tone  WNL    Balance  Static Sitting:  Not to toilet/BSC: Assess at next visit as able    To be met in 5 Visits:  1). Supine to/from Sit:  Assess at next visit as able  2). Upper Body Bathing:   Supervision  3). Lower Body Bathing:   Supervision  4). Upper Body Dressing:  Supervision  5). Lower Body Dressing:  Assess at next visit as able  6). Pt to demonstrate UE exs x 15 reps with minimal cues    Rehabilitation Potential:  Good for goals listed above. Strengths for achieving goals include: PLOF  Barriers to achieving goals include:  Complexity of condition     Plan: To be seen 3-5 x/wk while in acute care setting for therapeutic exercises, bed mobility, transfers, dressing, bathing, family/patient education, ADL/IADL retraining, energy conservation training. Anil Russell OTR/L #[id number]      If patient discharges from this facility prior to next visit, this note will serve as the Discharge Summary

## 2022-09-24 NOTE — PROGRESS NOTES
Noted redness to patients eyes, when I asked him if his eyes were bothering him he stated \" yeah and they are a little itchy\" patient then requested pain medication , [address] went to gets pts medications came back and pt had noticeable swelling to his left eye and left lip, he also had very big welted hives to his groin, thighs and buttocks, pt states \"I am very itchy\" pts speech started to get mumbled and pt states \" its hard to talk and I feel like my tongue is swollen a little\" . Charge nurse alerted, charge nurse came to see pt and initiated a rapid response on patient. Clinical, respiratory, MD , charge nurse, myself, and pharmacy was present at bedside. MD ordered benadryl IV, solumedrol IV, Pepcid IV, and EPI IM . Those medications were administered and patient was then transferred to ICU for observation . Bedside report given to Lance Gonzalez.

## 2022-09-24 NOTE — PROGRESS NOTES
GI consult called to on call answering service-[facility], as no GI history noted, 9/24/22 @ 0705-Saman Sheridan

## 2022-09-24 NOTE — SIGNIFICANT EVENT
Rapid Response Team  Progress Note  3012/3012-01      Event Date: 9/23/2022   Time Called: 2325 Arrival Time: 2327 Event End Time: 2355    Room#: 731  Nurse Responder: Amanda Mariposa Therapist Responder: Krystal Garcia  Patient RN: Myah Barker    Attending MD: Andrés Up MD Notified: Yes, Nocturnist responded to RRT       Situation: (Brief reason RRT requested) allergic reaction during ATB administration   Paged Overhead: No   Previous MD Orders: no        Background: Admit Date: 9/23/2022    Code Status: FULL    Pertinent Medical/Surgical Hx: Allergy to codeine, flexeil      Assessment: BP: (!) 155/85 Heart Rate: (!) 101 Resp: 20 Temp: 97.3 °F (36.3 °C) SpO2: 95 %    MEWS Score: 2     Mental Status: alert, oriented   Neuro Check: fsc   CV: tachycardic   Respiratory: SOB, nasal canula placed   Abdomen: soft   Other: welts noted to RUE, eyes, ABD And swelling,  speech slightly slurred   Does Pt meet Early Sepsis warning signs? Temp < 95F or > 100.4F     WBC > 12,000 or < 4000  Or > 10% bands     HR < 50 or > 110    RR < 8 or > 20    SBP < 100 or >200     Decreased Urine Output? Recommendation/Interventions:   RRT Orders: (Breathing, CP, Circulation, Neuro, Mews >4)  EPI, solumedrol, Protonix, benadryl      Results: pt with reduced redness and swelling      Patient Outcome:   Stayed on Unit: No   Transfer to Critical Care/Tele/ED: Yes   Code Blue: No   Code Status Changed to DNR: No  Rapid Response Team  1 Hour Follow-up    Results: pt remains stable with ICU monitoring.

## 2022-09-24 NOTE — PROGRESS NOTES
Patient admitted to room 306 from ER. Patient oriented to room, call light, bed rails, phone, lights and bathroom. Patient instructed about the schedule of the day including: vital sign frequency, lab draws, possible tests, frequency of MD and staff rounds, daily weights, I &O's and prescribed diet. Bed alarm deferred patient low fall risk. Telemetry box in place, patient aware of placement and reason. Bed locked, in lowest position, side rails up 2/4, call light within reach. Recliner Assessment:     Patient is able to demonstrate the ability to move from a reclining position to an upright position within the recliner. Bedside Mobility Assessment Tool (BMAT):     Assessment Level 1- Sit and Shake    1. From a semi-reclined position, ask patient to sit up and rotate to a seated position at the side of the bed. Can use the bedrail. 2. Ask patient to reach out and grab your hand and shake making sure patient reaches across his/her midline. Pass- Patient is able to come to a seated position, maintain core strength. Maintains seated balance while reaching across midline. Move on to Assessment Level 2. Assessment Level 2- Stretch and Point   1. With patient in seated position at the side of the bed, have patient place both feet on the floor (or stool) with knees no higher than hips. 2. Ask patient to stretch one leg and straighten the knee, then bend the ankle/flex and point the toes. If appropriate, repeat with the other leg. Pass- Patient is able to demonstrate appropriate quad strength on intended weight bearing limb(s). Move onto Assessment Level 3. Assessment Level 3- Stand   1. Ask patient to elevate off the bed or chair (seated to standing) using an assistive device (cane, bedrail). 2. Patient should be able to raise buttocks off be and hold for a count of five. May repeat once.    Pass- Patient maintains standing stability for at least 5 seconds, proceed to assessment level 4.    Assessment Level 4- Walk   1. Ask patient to march in place at bedside. 2. Then ask patient to advance step and return each foot. Some medical conditions may render a patient from stepping backwards, use your best clinical judgement. Pass- Patient demonstrates balance while shifting weight and ability to step, takes independent steps, does not use assistive device patient is MOBILITY LEVEL 4. Mobility Level- 4        4 Eyes Skin Assessment     The patient is being assess for   Admission    I agree that 2 RN's have performed a thorough Head to Toe Skin Assessment on the patient. ALL assessment sites listed below have been assessed. Areas assessed for pressure by both nurses:   [x]   Head, Face, and Ears   [x]   Shoulders, Back, and Chest, Abdomen  [x]   Arms, Elbows, and Hands   []   Coccyx, Sacrum, and Ischium  [x]   Legs, Feet, and Heels  Patient has scattered abrasions noted to BUE. Pt has wounds to Right shin and to back side of calf. Pt has an area to his right heel, refuses preventive mepilex's to heels. Pt also has some redness to buttock but would not allow writer to roll him over enough to get a good visual on his buttocks. He wouldn't allow me to roll him enough due to his pain in his abdomen and some SOB. Pt also has some abrasions to the back of his neck as well. Skin appears to be dry flaky and very dirty, dry stool noted to BLE , attempted to scrub with bath wipes and CHG wipes with very little success. Pts toe nails are very long and dirty, fingernails are dirty. Pts over all hygiene is very poor. His hair is matted as well.          Skin Assessed Under all Medical Devices by both nurses:  N/a               All Mepilex Borders were peeled back and area peeked at by both nurses:  No: refused mepilex  Please list where Mepilex Borders are located:  n/a refused mepilex             **SHARE this note so that the co-signing nurse is able to place an eSignature**    Co-signer eSignature: {Esignature:[id number]}    Does the Patient have Skin Breakdown related to pressure? Yes LDA WOUND CARE was Initiated documentation include the Demetra-wound, Wound Assessment, Measurements, Dressing Treatment, Drainage, and Color\",                    Evans Prevention initiated:  n/a   Wound Care Orders initiated:  No      WOC nurse consulted for Pressure Injury (Stage 3,4, Unstageable, DTI, NWPT, Complex wounds)and New or Established Ostomies:  unable to see bottom enough to tell if there is a stage able wound, will try to reevaluate when patient is up to bed side commode.        Primary Nurse eSignature: Electronically signed by Shelly Middleton RN on 9/24/22 at 12:43 AM EDT

## 2022-09-24 NOTE — PROGRESS NOTES
09:00 Critical care rounds completed @ the bedside w/ Dr. Paul Pratt   10:16 Pt awake a&o x4, VSS assessment as charted  13:20 Pt remains awake a&o x4, Re assessment unchanged and as charted   15:00 Pt downgraded to PCU status, pt remains awake a&o x4, on 2L per NC  19:00 Pt awakes to voice, a&o 4, handoff report given to night nurse Anusha Rodriguez , pt stable @ handoff

## 2022-09-24 NOTE — PROGRESS NOTES
Inpatient Physical Therapy Evaluation and Treatment    Unit: ICU  Date:  9/24/2022  Patient Name:    Mere Miller  Admitting diagnosis:  Metabolic acidosis [S52.4]  Chest pain [R07.9]  T wave inversion in EKG [R94.31]  COVID [U07.1]  Admit Date:  9/23/2022  Precautions/Restrictions/WB Status/ Lines/ Wounds/ Oxygen: Fall risk, Bed/chair alarm, Lines -IV and Purewick catheter, Telemetry, Continuous pulse oximetry, and Isolation Precautions: Droplet Plus - COVID      Patient cleared for EOB activity this date by RN. Treatment Time:  13:45-14:29  Treatment Number:  1   Timed Code Treatment Minutes: 34 minutes  Total Treatment Minutes:    44minutes    Patient Goals for Therapy: \" none stated \"          Discharge Recommendations:  Continue to assess  DME needs for discharge:  continue to assess       Therapy recommendation for EMS Transport: continue to assess    Therapy recommendations for staff:   Bed level activites      History Of Present Illness:     [place]: \"The patient is a 46 y.o. male with pmhx of CAD, COPD, HTN, HLD, BILLIE, PAD who presented to [facility] ED with complaint of generally not feeling well for the past 2 weeks. His main complaint is his abdominal pain that has progressively gotten worse since Friday. Pain is diffuse sharp stabbing and cramping. He has also had associated diarrhea, multiple episodes daily. No blood or black stool. No pain with bowel movements. He denies any vomiting but has felt nauseous, and has not been able to eat or drink much lately 2/2 to poor appetite and pain. Feels like his abdomen has become more distended. He is also complaining of vague symptoms of fatigue, weakness, light-headedness and shortness of breath with exertion. No dizziness or syncope. No falls at home. He has had a productive cough with green white sputum for 2 weeks that has progressively gotten better but is still present.    Had on episode of sharp left sided chest pain that self terminated and is not present currently. No fever or chills. No hematuria or dysuria. His daughter are also sick with similar symptoms. He does smoke cigarettes. Does not wear oxygen at home. \"    [place]:  \"Chief Complaint: The patient is a 46 y.o. male with pmhx of CAD, COPD, HTN, HLD, BILLIE, PAD who presented to [facility] ED with complaint of generally not feeling well for the past 2 weeks. His main complaint is his abdominal pain that has progressively gotten worse since Friday. Pain is diffuse sharp stabbing and cramping. He has also had associated diarrhea, multiple episodes daily. No blood or black stool           He was started on cipro and flagyl empiric and developed skin rash and lip and eye lid swelling and was transferred to ICU last night and given benadryl and solumedrol. \"      Per Dr. Lori Murrell: \"Assessment:  Anum Tran is a 46 y.o. patient who presented to [facility] 9/23/2022 with complaints of diarrhea, abd pain, CP, and sob. He has a PMH significant for CAD s/p PCI of the prox RCA 5/25/2019 in-stent thrombosis,  PAD s/p aortobifemoral bypass surgery  (Dr Abi Styles 5/28/2019 now followed by Dr. Benito Murray), HLD, HTN, BILLIE, hx MI, and COPD. Most recent blu-Myoview 6/1/2017 was negative for ischemia with an EF=55%. Most recent Echo 5/11/2020 EF 50-55%; Borderline cLVH. Now presents with c/o cough, congestion, abd pain for over 1 week. Reported 1 episode of CP early this AM; left sided; sharp; no radiation; resolved spontaneously. CXR noted diffuse bilateral ASD reflecting pulmonary edema vs pneumonia and cardiomegaly. Chest/Abd CT noted pneumonia and mild ileus vs enterocolitis. EKG noted  bpm; ST change anterolateral c/w possible ischemia (ST change new from 5/19). LABS: , K+ 3.9, BUN/Cr 29/1.0, , ALT 13, AST 24, H/H 15.5/45. 6. [place] <0.1 x 2. Diagnosis of unspecified CP and abnormal EKG finding in middle-aged male with CAD history and Covid infection.      Recs:  Ruling out for MI and no current CP. I recommend baby aspirin, beta blocker, statin, and nitrates. I recommend ECHO to evaluate LV function and size, wall motion, and valves for any structural abnormalities. Treat Covid as appropriate\"    Radiology:  CT ABDOMEN PELVIS W IV CONTRAST Additional Contrast? None   Final Result   1. Negative for pulmonary embolus. 2. Multifocal bilateral atypical pneumonia. 3. Mild ileus versus enterocolitis. 4. Improving pelvic adenopathy. CT CHEST PULMONARY EMBOLISM W CONTRAST   Final Result   1. Negative for pulmonary embolus. 2. Multifocal bilateral atypical pneumonia. 3. Mild ileus versus enterocolitis. 4. Improving pelvic adenopathy. XR CHEST PORTABLE   Final Result   Diffuse bilateral airspace disease reflecting pulmonary edema versus   pneumonia. Cardiomegaly. Home Health S4 Level Recommendation:    AM-PAC Mobility Score    AM-PAC Inpatient Mobility Raw Score : 16       Preadmission Environment    Pt. Lives Alone  Home environment:  apartment   Steps to enter first floor: No steps  Steps to second floor: N/A  Bathroom: walk in shower, grab bars, standard height commode, and shower chair without back  Equipment owned:  none    Preadmission Status:  Pt. Able to drive: Yes  Pt Fully independent with ADLs: Yes  Pt sleeps in recliner  Pt. Required assistance from family for: Independent PTA  Pt. independent for transfers and gait and walked with No Device  History of falls No    Pain   No    Cognition    A&O Person, Place, Time, and Situation   Able to follow 2 step commands    Subjective  Patient lying supine in bed with no family present. Pt agreeable to this PT eval & tx. Upper Extremity ROM/Strength  Please see OT evaluation.       Lower Extremity ROM / Strength   AROM WFL: Yes  ROM limitations:   WFL within the bed    Lower Extremity Sensation    NT    Lower Extremity Proprioception:   NT    Coordination and Tone  NT    Balance  Sitting:  Not tested; Not Tested  Comments:     Standing: Not tested; Not Tested  Comments:     Bed Mobility   Supine to Sit:    Not Tested  Sit to Supine:   Not Tested  Rolling:   SBA  Scooting in sitting: Not Tested  Scooting in supine: Total A    Transfer Training     Sit to stand:   Not Tested  Stand to sit:   Not Tested  Bed to Chair:   Not Tested with use of N/A    Gait gait deferred due to low ANGELINA within the bed; pt ambulated 0 ft. Activity Tolerance   Pt completed therapy session with  low BP with bed level activies   BP HR SpO2   Supine, at rest 87/52 73bpm 96% on 2L   Seated at EOB 95/65 75bpm 97%   Rolling 86/58 87bpm %      Positioning Needs   Pt in bed, no alarm activated per RN, positioned in proper neutral alignment and pressure relief provided. Call light provided and all needs within reach    Exercises Initiated  all completed bilaterally unless indicated  Ankle Pumps x 10 reps  Heel slides x 10 reps  Hip abduction: x 10 reps    Other  None. Patient/Family Education   Pt educated on role of inpatient PT, POC, importance of continued activity, safety awareness, transfer techniques, and calling for assist with mobility. Assessment  Pt seen for Physical Therapy evaluation in acute care setting. Pt demonstrated decreased Activity tolerance as well as decreased independence with Ambulation and Transfers. PAtient will benefit from skilled PT to maximize functional mobility while in acute care. Discharge plans are incomplete at this time, as assessment was limited by low BP. Goals : To be met in 3 visits:  1). Independent with LE Ex x 10 reps  2.) Bed to chair:  tolerate assessment    To be met in 6 visits:  1). Supine to/from sit: Independent  2). Sit to/from stand: Independent  3). Bed to chair: Independent  4). Gait: Ambulate  125 ft.   with  Independent and use of LRAD (least restrictive assistive device)  5).   Tolerate B LE exercises 3 sets of 10-15 reps    Rehabilitation Potential: Good  Strengths for achieving goals include:   Pt motivated, PLOF, and Pt cooperative   Barriers to achieving goals include:    Complexity of condition    Plan    To be seen 3-5 x / week  while in acute care setting for therapeutic exercises, bed mobility, transfers, progressive gait training, balance training, and family/patient education. Signature: Rj Snyder, PT #[id number]     If patient discharges from this facility prior to next visit, this note will serve as the Discharge Summary.

## 2022-09-24 NOTE — PLAN OF CARE
Problem: Pain  Goal: Verbalizes/displays adequate comfort level or baseline comfort level  9/24/2022 1954 by Wander Victor RN  Outcome: Progressing  9/24/2022 1630 by Camille Mason RN  Outcome: Progressing     Problem: ABCDS Injury Assessment  Goal: Absence of physical injury  9/24/2022 1954 by Wander Victor RN  Outcome: Progressing  9/24/2022 1630 by Camille Mason RN  Outcome: Progressing     Problem: Nutrition Deficit:  Goal: Optimize nutritional status  Outcome: Progressing

## 2022-09-24 NOTE — CONSULTS
Consult Note     Patient: Saadia Monterroso MRN: [id number]   YOB: 1970 Age: 46 y.o. Sex: male   Unit: [facility] ICU Room/Bed: 9702/3915-80 Location: [address]    Admitting Physician: Debra Walker    Primary Care Physician: PHANI Go - CNP   Admission Date: 9/23/2022   Consult Date: 9/24/2022     Assessment/Plan:    1. Subacute abdominal pain with diarrhea and abnormal CT scan with with gas-filled small intestine and colon  2. COVID-pneumonia  3. COPD  4. CHF  5.  CAD    Discussion: The patient's presentation is more consistent with an ileus. Review of the CT scan has not consistent with an enterocolitis. 1.  Fecal white cells  2. Repeat abdominal film tomorrow  3. Continue regular diet for now    Subjective:    History of Present Illness: Saadia Monterroso is a 46 y.o. male who is seen in consultation at the request of Debra Walker for abdominal pain. The patient's history is not consistent but seems to suggest that he has had abdominal pain for roughly a week. He describes it as generalized. With this he has had loose stools. He denies any instigating factors and alleviating factors. Currently he is feeling better and his abdominal pain is less. He is eating and tolerating a diet. He does not report any previous history of vomiting. He did not report any signs of GI bleeding. In general he is a poor informant.       Past Medical History:   Diagnosis Date    CAD (coronary artery disease)     Cardiomyopathy ([facility] [address] 05/25/2019    EF= 40%     COPD (chronic obstructive pulmonary disease) ([facility])     Hx of blood clots     Hyperlipidemia     Hypertension     MI (myocardial infarction) ([facility])     BILLIE (obstructive sleep apnea)     PAD (peripheral artery disease) ([facility])     Pulmonary nodule     S/P coronary artery stent placement     Vitamin D deficiency      Past Surgical History:   Procedure Laterality Date    ABDOMINAL AORTIC ANEURYSM REPAIR alternative oral replacement **OR** potassium chloride, magnesium sulfate, ondansetron **OR** ondansetron, polyethylene glycol, acetaminophen **OR** acetaminophen, guaiFENesin-dextromethorphan, albuterol-ipratropium  Family History   Adopted: Yes   Problem Relation Age of Onset    Heart Disease Other         dont know-pt adopted    Heart Failure Neg Hx     Asthma Neg Hx     Cancer Neg Hx     Diabetes Neg Hx     Emphysema Neg Hx     Hypertension Neg Hx      Social History     Tobacco Use    Smoking status: Every Day     Packs/day: 0.50     Years: 40.00     Pack years: 20.00     Types: Cigarettes    Smokeless tobacco: Never    Tobacco comments:     1/2ppd   Substance Use Topics    Alcohol use: Yes     Alcohol/week: 0.0 standard drinks     Comment: 6 pack day       Objective:    Physical Exam:  BP (!) 88/58   Pulse 72   Temp 96.8 °F (36 °C) (Axillary)   Resp 15   Ht 6' (1.829 m)   Wt 252 lb 12.8 oz (114.7 kg)   SpO2 93%   BMI 34.29 kg/m²    General:  comfortable  Heent: There is no scleral icterus. The neck is supple without masses  Cardiovascular: The heart is regular rate. Respiratory:  The patient's breathing is non-labored with normal chest wall excursion and normal muscle movement. Abdomen: The abdomen is mildly distended and tympanitic. There is no focal tenderness. I did not appreciate any organomegaly. Extremities: There is a wound over his left lower leg pretibial area. There is trace edema  Neurological:  Gross memory appears intact. Patient is alert and oriented. Lymphatic:  No cervical,supraclavicular or femoral lymphadenopathy.     Labs and Imaging  Reviewed      Signed By: Dank Rojas MD   September 24, 2022

## 2022-09-24 NOTE — PROGRESS NOTES
Pt's facial swelling is much improved Eyes are back to normal. Pt states tongue is not swollen. Pt still has traces of hives on his legs. Kamala Bob

## 2022-09-24 NOTE — PLAN OF CARE
Was called to evaluate patient anaphylaxis. He developed Hives, soft tissue swelling and hoarseness in his voice after receiving Cipro / flagyl. Unclear which exact abx led to this reaction. He had swelling of his eyelids, hoarse voice and hives on his torso and legs. - Stop cipro/flagyl   - Given 1 dose of epi IM, Solumedrol 125 x 1, Benadryl 50 and pepcid. - Will move to the ICU for closer observation as he still has pretty significant periorbital swelling.

## 2022-09-24 NOTE — PROGRESS NOTES
RT Inhaler-Nebulizer Bronchodilator Protocol Note    There is a bronchodilator order in the chart from a provider indicating to follow the RT Bronchodilator Protocol and there is an Initiate RT Inhaler-Nebulizer Bronchodilator Protocol order as well (see protocol at bottom of note). CXR Findings:  XR CHEST PORTABLE    Result Date: 9/23/2022  Diffuse bilateral airspace disease reflecting pulmonary edema versus pneumonia. Cardiomegaly. The findings from the last RT Protocol Assessment were as follows:   History Pulmonary Disease: Chronic pulmonary disease  Respiratory Pattern: Regular pattern and RR 12-20 bpm  Breath Sounds: Clear breath sounds  Cough: Strong, spontaneous, non-productive  Indication for Bronchodilator Therapy: None  Bronchodilator Assessment Score: 2    Aerosolized bronchodilator medication orders have been revised according to the RT Inhaler-Nebulizer Bronchodilator Protocol below. Respiratory Therapist to perform RT Therapy Protocol Assessment initially then follow the protocol. Repeat RT Therapy Protocol Assessment PRN for score 0-3 or on second treatment, BID, and PRN for scores above 3. No Indications - adjust the frequency to every 6 hours PRN wheezing or bronchospasm, if no treatments needed after 48 hours then discontinue using Per Protocol order mode. If indication present, adjust the RT bronchodilator orders based on the Bronchodilator Assessment Score as indicated below. Use Inhaler orders unless patient has one or more of the following: on home nebulizer, not able to hold breath for 10 seconds, is not alert and oriented, cannot activate and use MDI correctly, or respiratory rate 25 breaths per minute or more, then use the equivalent nebulizer order(s) with same Frequency and PRN reasons based on the score. If a patient is on this medication at home then do not decrease Frequency below that used at home.     0-3 - enter or revise RT bronchodilator order(s) to equivalent RT Bronchodilator order with Frequency of every 4 hours PRN for wheezing or increased work of breathing using Per Protocol order mode. 4-6 - enter or revise RT Bronchodilator order(s) to two equivalent RT bronchodilator orders with one order with BID Frequency and one order with Frequency of every 4 hours PRN wheezing or increased work of breathing using Per Protocol order mode. 7-10 - enter or revise RT Bronchodilator order(s) to two equivalent RT bronchodilator orders with one order with TID Frequency and one order with Frequency of every 4 hours PRN wheezing or increased work of breathing using Per Protocol order mode. 11-13 - enter or revise RT Bronchodilator order(s) to one equivalent RT bronchodilator order with QID Frequency and an Albuterol order with Frequency of every 4 hours PRN wheezing or increased work of breathing using Per Protocol order mode. Greater than 13 - enter or revise RT Bronchodilator order(s) to one equivalent RT bronchodilator order with every 4 hours Frequency and an Albuterol order with Frequency of every 2 hours PRN wheezing or increased work of breathing using Per Protocol order mode.        Electronically signed by Krysta Roper RCP on 9/23/2022 at 11:43 PM

## 2022-09-24 NOTE — PROGRESS NOTES
It looks like the patient has been on 200mg of Lyrica TID for some time, please confirm home dose if possible, 25mg TID ordered on admission, If a dose reduction was not intended, consider adjusting if 200mg TID is accurate. Addendum: RN called to ask why not verified. Explained that reason was in sticky note and reiterated the [facility] was questioning strength. [facility] asked RN to please go verify with pt which dose. If dose is the higher dose, [facility] suggested calling nocturnist for a new order mirroring his home dose. [facility] did confirm with [facility] and [facility] that he has regularly been filling the Lyrica 200 mg #90 for 30 days. Last fill was 8/29/22 at [facility].      An Owens, PharmD, [facility], 9/23/2022 10:06 PM

## 2022-09-25 ENCOUNTER — APPOINTMENT (OUTPATIENT)
Dept: GENERAL RADIOLOGY | Age: 52
DRG: 137 | End: 2022-09-25
Payer: COMMERCIAL

## 2022-09-25 LAB
ANION GAP SERPL CALCULATED.3IONS-SCNC: 11 MMOL/L (ref 3–16)
BASOPHILS ABSOLUTE: 0 K/UL (ref 0–0.2)
BASOPHILS RELATIVE PERCENT: 0.1 %
BUN BLDV-MCNC: 33 MG/DL (ref 7–20)
CALCIUM SERPL-MCNC: 9 MG/DL (ref 8.3–10.6)
CHLORIDE BLD-SCNC: 104 MMOL/L (ref 99–110)
CO2: 23 MMOL/L (ref 21–32)
CREAT SERPL-MCNC: 1 MG/DL (ref 0.9–1.3)
EOSINOPHILS ABSOLUTE: 0.1 K/UL (ref 0–0.6)
EOSINOPHILS RELATIVE PERCENT: 0.3 %
GFR AFRICAN AMERICAN: >60
GFR NON-AFRICAN AMERICAN: >60
GI BACTERIAL PATHOGENS BY PCR: NORMAL
GLUCOSE BLD-MCNC: 139 MG/DL (ref 70–99)
HCT VFR BLD CALC: 39.7 % (ref 40.5–52.5)
HEMOGLOBIN: 13.4 G/DL (ref 13.5–17.5)
LYMPHOCYTES ABSOLUTE: 0.8 K/UL (ref 1–5.1)
LYMPHOCYTES RELATIVE PERCENT: 3.9 %
MCH RBC QN AUTO: 30.3 PG (ref 26–34)
MCHC RBC AUTO-ENTMCNC: 33.7 G/DL (ref 31–36)
MCV RBC AUTO: 90.1 FL (ref 80–100)
MONOCYTES ABSOLUTE: 1.1 K/UL (ref 0–1.3)
MONOCYTES RELATIVE PERCENT: 5.2 %
NEUTROPHILS ABSOLUTE: 19.5 K/UL (ref 1.7–7.7)
NEUTROPHILS RELATIVE PERCENT: 90.5 %
PDW BLD-RTO: 13.9 % (ref 12.4–15.4)
PLATELET # BLD: 438 K/UL (ref 135–450)
PMV BLD AUTO: 7.9 FL (ref 5–10.5)
POTASSIUM REFLEX MAGNESIUM: 4 MMOL/L (ref 3.5–5.1)
RBC # BLD: 4.41 M/UL (ref 4.2–5.9)
SODIUM BLD-SCNC: 138 MMOL/L (ref 136–145)
WBC # BLD: 21.6 K/UL (ref 4–11)

## 2022-09-25 PROCEDURE — 80048 BASIC METABOLIC PNL TOTAL CA: CPT

## 2022-09-25 PROCEDURE — 94640 AIRWAY INHALATION TREATMENT: CPT

## 2022-09-25 PROCEDURE — 85025 COMPLETE CBC W/AUTO DIFF WBC: CPT

## 2022-09-25 PROCEDURE — 2500000003 HC RX 250 WO HCPCS: Performed by: INTERNAL MEDICINE

## 2022-09-25 PROCEDURE — 6360000002 HC RX W HCPCS: Performed by: INTERNAL MEDICINE

## 2022-09-25 PROCEDURE — 6370000000 HC RX 637 (ALT 250 FOR IP): Performed by: INTERNAL MEDICINE

## 2022-09-25 PROCEDURE — 2580000003 HC RX 258: Performed by: INTERNAL MEDICINE

## 2022-09-25 PROCEDURE — 99233 SBSQ HOSP IP/OBS HIGH 50: CPT | Performed by: INTERNAL MEDICINE

## 2022-09-25 PROCEDURE — 36415 COLL VENOUS BLD VENIPUNCTURE: CPT

## 2022-09-25 PROCEDURE — 94761 N-INVAS EAR/PLS OXIMETRY MLT: CPT

## 2022-09-25 PROCEDURE — 2700000000 HC OXYGEN THERAPY PER DAY

## 2022-09-25 PROCEDURE — 2060000000 HC ICU INTERMEDIATE R&B

## 2022-09-25 PROCEDURE — 74018 RADEX ABDOMEN 1 VIEW: CPT

## 2022-09-25 RX ADMIN — IPRATROPIUM BROMIDE AND ALBUTEROL 1 PUFF: 20; 100 SPRAY, METERED RESPIRATORY (INHALATION) at 20:05

## 2022-09-25 RX ADMIN — CLOPIDOGREL BISULFATE 75 MG: 75 TABLET, FILM COATED ORAL at 10:33

## 2022-09-25 RX ADMIN — FAMOTIDINE 20 MG: 10 INJECTION INTRAVENOUS at 10:28

## 2022-09-25 RX ADMIN — IPRATROPIUM BROMIDE AND ALBUTEROL 1 PUFF: 20; 100 SPRAY, METERED RESPIRATORY (INHALATION) at 07:28

## 2022-09-25 RX ADMIN — FAMOTIDINE 20 MG: 10 INJECTION INTRAVENOUS at 20:35

## 2022-09-25 RX ADMIN — Medication 10 ML: at 20:37

## 2022-09-25 RX ADMIN — ENOXAPARIN SODIUM 30 MG: 100 INJECTION SUBCUTANEOUS at 20:35

## 2022-09-25 RX ADMIN — BUPROPION HYDROCHLORIDE 150 MG: 150 TABLET, EXTENDED RELEASE ORAL at 20:34

## 2022-09-25 RX ADMIN — ASPIRIN 81 MG: 81 TABLET, CHEWABLE ORAL at 10:22

## 2022-09-25 RX ADMIN — Medication 10 ML: at 10:28

## 2022-09-25 RX ADMIN — PREGABALIN 200 MG: 100 CAPSULE ORAL at 20:35

## 2022-09-25 RX ADMIN — CETIRIZINE HYDROCHLORIDE 10 MG: 10 TABLET ORAL at 10:22

## 2022-09-25 RX ADMIN — ATORVASTATIN CALCIUM 80 MG: 40 TABLET, FILM COATED ORAL at 20:34

## 2022-09-25 RX ADMIN — ENOXAPARIN SODIUM 30 MG: 100 INJECTION SUBCUTANEOUS at 10:25

## 2022-09-25 RX ADMIN — PREGABALIN 200 MG: 100 CAPSULE ORAL at 15:06

## 2022-09-25 RX ADMIN — DEXAMETHASONE 6 MG: 4 TABLET ORAL at 10:23

## 2022-09-25 RX ADMIN — BUPROPION HYDROCHLORIDE 150 MG: 150 TABLET, EXTENDED RELEASE ORAL at 10:23

## 2022-09-25 RX ADMIN — PREGABALIN 200 MG: 100 CAPSULE ORAL at 10:22

## 2022-09-25 RX ADMIN — OXYCODONE HYDROCHLORIDE AND ACETAMINOPHEN 1 TABLET: 7.5; 325 TABLET ORAL at 10:23

## 2022-09-25 RX ADMIN — METOPROLOL SUCCINATE 25 MG: 25 TABLET, EXTENDED RELEASE ORAL at 10:22

## 2022-09-25 ASSESSMENT — PAIN SCALES - GENERAL
PAINLEVEL_OUTOF10: 0
PAINLEVEL_OUTOF10: 0
PAINLEVEL_OUTOF10: 10

## 2022-09-25 ASSESSMENT — PAIN DESCRIPTION - PAIN TYPE: TYPE: ACUTE PAIN

## 2022-09-25 ASSESSMENT — PAIN DESCRIPTION - LOCATION: LOCATION: ABDOMEN;BACK

## 2022-09-25 ASSESSMENT — PAIN - FUNCTIONAL ASSESSMENT: PAIN_FUNCTIONAL_ASSESSMENT: ACTIVITIES ARE NOT PREVENTED

## 2022-09-25 ASSESSMENT — PAIN DESCRIPTION - DESCRIPTORS: DESCRIPTORS: ACHING;DISCOMFORT

## 2022-09-25 NOTE — PROGRESS NOTES
Pulmonary Progress Note  CC: COVID 19 pneumonia    Subjective:  some SOB  Less abd pain      EXAM: /79   Pulse 56   Temp 97.7 °F (36.5 °C) (Oral)   Resp 14   Ht 6' (1.829 m)   Wt 252 lb 12.8 oz (114.7 kg)   SpO2 98%   BMI 34.29 kg/m²  on 2L  Constitutional:  No acute distress. Eyes: PERRL. Conjunctivae anicteric. ENT: Normal nose. Normal tongue. Neck:  Trachea is midline. No thyroid tenderness. Respiratory: No accessory muscle usage. decreased breath sounds. No wheezes. No rales. No Rhonchi. Cardiovascular: Normal S1S2. No digit clubbing. No digit cyanosis. No LE edema. Psychiatric: No anxiety or Agitation. Alert and Oriented to person, place and time.     Scheduled Meds:   cetirizine  10 mg Oral Daily    lisinopril  10 mg Oral Daily    dexamethasone  6 mg Oral Daily    albuterol-ipratropium  1 puff Inhalation BID    aspirin  81 mg Oral Daily    clopidogrel  75 mg Oral Daily    metoprolol succinate  25 mg Oral Daily    atorvastatin  80 mg Oral Nightly    buPROPion  150 mg Oral BID    pregabalin  200 mg Oral TID    sodium chloride flush  5-40 mL IntraVENous 2 times per day    enoxaparin  30 mg SubCUTAneous BID    permethrin   Topical Once    famotidine (PEPCID) injection  20 mg IntraVENous BID     Continuous Infusions:   sodium chloride      sodium chloride 75 mL/hr at 09/25/22 0636     PRN Meds:  diphenhydrAMINE, oxyCODONE-acetaminophen, sodium chloride flush, sodium chloride, potassium chloride **OR** potassium alternative oral replacement **OR** potassium chloride, magnesium sulfate, ondansetron **OR** ondansetron, polyethylene glycol, acetaminophen **OR** acetaminophen, guaiFENesin-dextromethorphan, albuterol-ipratropium    Labs:  CBC:   Recent Labs     09/23/22  0910 09/24/22  0506 09/25/22  0423   WBC 19.1* 18.4* 21.6*   HGB 15.5 14.3 13.4*   HCT 45.6 42.4 39.7*   MCV 90.1 90.0 90.1   * 426 438     BMP:   Recent Labs     09/23/22  0910 09/24/22  0506 09/25/22  0423    136 138   K 3.9 3.9 4.0    103 104   CO2 20* 20* 23   BUN 29* 30* 33*   CREATININE 1.0 1.1 1.0     COVID detected  Influenza not detected   Stool studies pending      RADIOLOGY  CT ABDOMEN PELVIS W IV CONTRAST Additional Contrast? None   Final Result   1. Negative for pulmonary embolus. 2. Multifocal bilateral atypical pneumonia. 3. Mild ileus versus enterocolitis. 4. Improving pelvic adenopathy. CT CHEST PULMONARY EMBOLISM W CONTRAST   Final Result   1. Negative for pulmonary embolus. 2. Multifocal bilateral atypical pneumonia. 3. Mild ileus versus enterocolitis. 4. Improving pelvic adenopathy.          ASSESSMENT:  Acute hypoxic respiratory failure  COVID 19 pneumonia  COPD not on home O2  Mild ileus  Tobacco abuse  CP -resolved  CAD with PCI  Cardiomyopathy with EF 40%  Anaphylaxis to cipro or flagyl -resolved     PLAN:  Droplet Plus Airborne Precautions   Supplemental oxygen to keep saturation greater than 92%  Combivent  Decadron 6 mg QD, D#3, monitor glucose closely  GI and cardiology have seen  Tobacco cessation  Lovenox twice daily

## 2022-09-25 NOTE — PROGRESS NOTES
Hospitalist Progress Note      PCP: Grisel Olivia, APRN - CNP    Date of Admission: 9/23/2022    Chief Complaint: The patient is a 46 y.o. male with pmhx of CAD, COPD, HTN, HLD, BILLIE, PAD who presented to [facility] ED with complaint of generally not feeling well for the past 2 weeks. His main complaint is his abdominal pain that has progressively gotten worse since Friday. Pain is diffuse sharp stabbing and cramping. He has also had associated diarrhea, multiple episodes daily. No blood or black stool        He was started on cipro and flagyl empiric and developed skin rash and lip and eye lid swelling and was transferred to ICU last night and given benadryl and solumedrol. Subjective:     Still some diarrhea - much better. Off O2 this am.     Overall improved. Moved from ICU to PCU this morning.            Medications:  Reviewed    Infusion Medications    sodium chloride      sodium chloride 75 mL/hr at 09/25/22 0636     Scheduled Medications    cetirizine  10 mg Oral Daily    lisinopril  10 mg Oral Daily    dexamethasone  6 mg Oral Daily    albuterol-ipratropium  1 puff Inhalation BID    aspirin  81 mg Oral Daily    clopidogrel  75 mg Oral Daily    metoprolol succinate  25 mg Oral Daily    atorvastatin  80 mg Oral Nightly    buPROPion  150 mg Oral BID    pregabalin  200 mg Oral TID    sodium chloride flush  5-40 mL IntraVENous 2 times per day    enoxaparin  30 mg SubCUTAneous BID    permethrin   Topical Once    famotidine (PEPCID) injection  20 mg IntraVENous BID     PRN Meds: diphenhydrAMINE, oxyCODONE-acetaminophen, sodium chloride flush, sodium chloride, potassium chloride **OR** potassium alternative oral replacement **OR** potassium chloride, magnesium sulfate, ondansetron **OR** ondansetron, polyethylene glycol, acetaminophen **OR** acetaminophen, guaiFENesin-dextromethorphan, albuterol-ipratropium      Intake/Output Summary (Last 24 hours) at 9/25/2022 6029  Last data filed at 9/25/2022 1029  Gross per 24 hour   Intake 4143.16 ml   Output 800 ml   Net 3343.16 ml         Physical Exam Performed:    BP 99/69   Pulse 72   Temp 97.5 °F (36.4 °C) (Oral)   Resp 17   Ht 6' (1.829 m)   Wt 252 lb 12.8 oz (114.7 kg)   SpO2 95%   BMI 34.29 kg/m²     Gen: No distress. Alert. ++very unkept dissheveled, +chronically ill appearing   Eyes: PERRL. No sclera icterus. No conjunctival injection. Neck: No JVD. No Carotid Bruit. Trachea midline. Resp: No accessory muscle use. No crackles. +diminished breath sounds with rhonchi and wheezing throughout, some labored breathing, currently on RA sating 92%  CV: Regular rate. Regular rhythm. No murmur. No rub. No edema. Peripheral Pulses: cool extremities and very weak peripheral pulses bilaterally   GI: No masses. No organomegaly. No hernia. +hyperactive bowel sounds, +distended but soft, diffuse tenderness  Skin: Warm and dry. No nodule on exposed extremities. +erythematous bites on nape of neck, scattered bites on scalp and behind the ears bilaterally, +right leg with chronic wound with crusting and erythema,+erythema in panus area   M/S: No cyanosis. No joint deformity. No clubbing. Neuro: Awake. Grossly nonfocal    Psych: Oriented x 3. No anxiety or agitation. Labs:   Recent Labs     09/23/22  0910 09/24/22  0506 09/25/22  0423   WBC 19.1* 18.4* 21.6*   HGB 15.5 14.3 13.4*   HCT 45.6 42.4 39.7*   * 426 438       Recent Labs     09/23/22  0910 09/24/22  0506 09/25/22  0423    136 138   K 3.9 3.9 4.0    103 104   CO2 20* 20* 23   BUN 29* 30* 33*   CREATININE 1.0 1.1 1.0   CALCIUM 9.9 9.4 9.0       Recent Labs     09/23/22  0910   AST 24   ALT 13   BILITOT 0.7   ALKPHOS 94       No results for input(s): INR in the last 72 hours.   Recent Labs     09/23/22 2039 09/23/22 2305 09/24/22  0158   TROPONINI <0.01 <0.01 <0.01         Urinalysis:      Lab Results   Component Value Date/Time    NITRU Negative 09/23/2022 12:08 PM #CAD  -s/p stents   -ASA, statin, plavix, BB, imdur       #Enterocolitis vs ileus   -pt with diarrhea and leukocytosis  -cipro flagyl now stopped due to suspected allergic reaction.   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  -environmental precautions   -permethrin treatment       #Chronic wound to right lower extremity   -patient reports this is actually improved   -wound care consulted        #HTN   -on lisinopril and toprol XL        #HLD  -on statin        #PAD   -s/p aortobifemoral bypass   -s/p stents   -follows with vascular surgery   -on ASA, statin, plavix        #Carotid artery stenosis   -follows with vascular surgery       #BILLIE    -home BIPAP       #Chronic pain   -on percocet and lyrica   -OARRS verified        #Tobacco abuse   -recommend cessation        DVT Prophylaxis: Lovenox   Diet: No diet orders on file  Code Status: Prior     cc    Johnathon Morris MD

## 2022-09-25 NOTE — PROGRESS NOTES
[facility] [address]   Progress Note  Cardiology      HPI: Seeing Mr. Sommers today for f/u abnormal EKG finding. He reports feeling better and denies specific complaints. Tele reviewed NSR 68bpm        Physical Examination:    Vitals:    09/25/22 0600   BP: 106/79   Pulse: 56   Resp: 14   Temp:    SpO2: 98%          Constitutional and General Appearance: NAD   Respiratory:  Normal excursion and expansion without use of accessory muscles  Resp Auscultation: Normal breath sounds without dullness  Cardiovascular: The apical impulses not displaced  Heart tones are crisp and normal  Cervical veins are not engorged  The carotid upstroke is normal in amplitude and contour without delay or bruit  Normal S1S2, No S3, No Murmur  Peripheral pulses are symmetrical and full  There is no clubbing, cyanosis of the extremities. No edema  Pedal Pulses: 2+ and equal   Abdomen:  No masses or tenderness  Liver/Spleen: No Abnormalities Noted  Neurological/Psychiatric:  Alert and oriented in all spheres  Moves all extremities well  No abnormalities of mood, affect, memory, mentation, or behavior are noted  Skin: warm and dry        Lab Results   Component Value Date    WBC 21.6 (H) 09/25/2022    HGB 13.4 (L) 09/25/2022    HCT 39.7 (L) 09/25/2022    MCV 90.1 09/25/2022     09/25/2022     Lab Results   Component Value Date    CREATININE 1.0 09/25/2022    BUN 33 (H) 09/25/2022     09/25/2022    K 4.0 09/25/2022     09/25/2022    CO2 23 09/25/2022     Lab Results   Component Value Date    INR 1.18 (H) 05/28/2019    PROTIME 13.4 (H) 05/28/2019        ECHO 9/24/22 Summary   Definity contrast administered. Imaging is technically due to lung interface even with Definity. Left ventricular systolic function is normal with EF estimated at 55%. Endocardium not entirely well visualized but no obvious segmental wall   motion abnormalities. There is mild concentric left ventricular hypertrophy.    Normal left ventricular Active Problem List   Diagnosis    Acute inferior myocardial infarction [facility])    CAD (coronary artery disease)    Status post angioplasty with stent    HTN (hypertension)    Trochanteric bursitis    Mediastinal lymphadenopathy    Dyspnea    Tobacco abuse    Daytime somnolence    Obesity    Snoring    Acute transmural inferior wall MI (HCC)    Chronic alcoholism (HCC)    Ischemic rest pain of lower extremity    Fatty liver    Chest pain    T wave inversion in EKG    Lice infestation    COVID    HLD (hyperlipidemia)    PAD (peripheral artery disease) (HCC)    Enterocolitis    Abnormal finding on EKG    Hypotension    Acute respiratory failure with hypoxia ([address]

## 2022-09-25 NOTE — PROGRESS NOTES
Physician Progress Note      Jason Guzmán  CSN #:                  [id number]  :                       1970  ADMIT DATE:       2022 8:53 AM  DISCH DATE:  RESPONDING  PROVIDER #:        Dorothy Medina MD          QUERY TEXT:    Pt admitted with + Covid 19 PNA. Pt noted to have tachycardia and leukocytosis   on arrival. If possible, please document in the progress notes and discharge   summary if you are evaluating and /or treating any of the following: The medical record reflects the following:  Risk Factors: + Covid test, CAD, COPD  Clinical Indicators: per H&P- \"COVID + pneumonia. ..-with leukocytosis,   concerned for superimposed bacterial pneumonia. Bonne Major Bonne Major Enterocolitis vs ileus-pt   with diarrhea and leukocytosis-treating empirically with cipro and flagyl IV\"  on arrival , WBC 19.1, PCT 0.10  Treatment: 500 ml NS IVF bolus followed but 75 ml/hr cont. infusion,   Rocephin/Zithromax x1, Cipro/Flagyl x1 then discontinued, Cardiology   consult/GI consult, droplet isolation, Decadron/inhalers, monitored supportive   care in ICU after anaphylactic reaction    Thank you,  Alfonso Levin RN BSN  [email or web address]  Options provided:  -- Sepsis d/t Covid 19 PNA  -- Sepsis d/t please specify other source, please specify other source. -- Covid 19 PNA without Sepsis  -- Other - I will add my own diagnosis  -- Disagree - Not applicable / Not valid  -- Disagree - Clinically unable to determine / Unknown  -- Refer to Clinical Documentation Reviewer    PROVIDER RESPONSE TEXT:    This patient has Covid 19 PNA without Sepsis.     Query created by: Ncik Martins on 2022 12:49 PM      Electronically signed by:  Dorothy Medina MD 2022 1:56 PM

## 2022-09-25 NOTE — PROGRESS NOTES
Subjective: Says that he feels better and that his abdominal pain is improved. He is passing gas. Objective:    Vitals:  Vitals:    09/25/22 1022   BP:    Pulse:    Resp: 17   Temp:    SpO2:      Exam: Comfortable. Abdomen is soft and less distended. .  There is no focal tenderness or peritoneal signs. Labs and Imaging:  I reviewed the labs and imaging results from last 24 hours. White blood cell 21.6. Abdominal film reportedly improved. I reviewed and there is gas-filled small bowel and colon with gas in the rectum. Assessment and plan:  1. Ileus  2. Leukocytosis  3. COVID-pneumonia  4. COPD  5. CHF  6. CAD    1.   We will continue to follow and monitor exam    Jenelle Vincent MD  September 25, 2022

## 2022-09-25 NOTE — PROGRESS NOTES
Bedside report and transfer of care given to [address]. Pt currently resting in bed with the call light within reach. Pt denies any other care needs at this time. Pt stable at this time.       Denzel Buitrago RN

## 2022-09-25 NOTE — FLOWSHEET NOTE
09/25/22 1445   Vital Signs   Temp 97.7 °F (36.5 °C)   Temp Source Oral   Heart Rate 63   Heart Rate Source Monitor   Resp 17   /75   BP Location Left upper arm   MAP (Calculated) 90.67   Patient Position Semi fowlers   Level of Consciousness 0   MEWS Score 1   Pain Assessment   Pain Assessment 0-10   Pain Level 0   Oxygen Therapy   SpO2 94 %   O2 Device Nasal cannula   O2 Flow Rate (L/min) 0 L/min       Afternoon vitals completed.  Meds given per Ching Lino RN

## 2022-09-25 NOTE — PROGRESS NOTES
RT Inhaler-Nebulizer Bronchodilator Protocol Note    There is a bronchodilator order in the chart from a provider indicating to follow the RT Bronchodilator Protocol and there is an Initiate RT Inhaler-Nebulizer Bronchodilator Protocol order as well (see protocol at bottom of note). CXR Findings:  No results found. The findings from the last RT Protocol Assessment were as follows:   History Pulmonary Disease: Chronic pulmonary disease  Respiratory Pattern: Regular pattern and RR 12-20 bpm  Breath Sounds: Slightly diminished and/or crackles  Cough: Strong, spontaneous, non-productive  Indication for Bronchodilator Therapy: (P) Decreased or absent breath sounds  Bronchodilator Assessment Score: 4    Aerosolized bronchodilator medication orders have been revised according to the RT Inhaler-Nebulizer Bronchodilator Protocol below. Respiratory Therapist to perform RT Therapy Protocol Assessment initially then follow the protocol. Repeat RT Therapy Protocol Assessment PRN for score 0-3 or on second treatment, BID, and PRN for scores above 3. No Indications - adjust the frequency to every 6 hours PRN wheezing or bronchospasm, if no treatments needed after 48 hours then discontinue using Per Protocol order mode. If indication present, adjust the RT bronchodilator orders based on the Bronchodilator Assessment Score as indicated below. Use Inhaler orders unless patient has one or more of the following: on home nebulizer, not able to hold breath for 10 seconds, is not alert and oriented, cannot activate and use MDI correctly, or respiratory rate 25 breaths per minute or more, then use the equivalent nebulizer order(s) with same Frequency and PRN reasons based on the score. If a patient is on this medication at home then do not decrease Frequency below that used at home.     0-3 - enter or revise RT bronchodilator order(s) to equivalent RT Bronchodilator order with Frequency of every 4 hours PRN for wheezing or increased work of breathing using Per Protocol order mode. 4-6 - enter or revise RT Bronchodilator order(s) to two equivalent RT bronchodilator orders with one order with BID Frequency and one order with Frequency of every 4 hours PRN wheezing or increased work of breathing using Per Protocol order mode. 7-10 - enter or revise RT Bronchodilator order(s) to two equivalent RT bronchodilator orders with one order with TID Frequency and one order with Frequency of every 4 hours PRN wheezing or increased work of breathing using Per Protocol order mode. 11-13 - enter or revise RT Bronchodilator order(s) to one equivalent RT bronchodilator order with QID Frequency and an Albuterol order with Frequency of every 4 hours PRN wheezing or increased work of breathing using Per Protocol order mode. Greater than 13 - enter or revise RT Bronchodilator order(s) to one equivalent RT bronchodilator order with every 4 hours Frequency and an Albuterol order with Frequency of every 2 hours PRN wheezing or increased work of breathing using Per Protocol order mode. RT to enter RT Home Evaluation for COPD & MDI Assessment order using Per Protocol order mode.     Electronically signed by Madison Chen RCP on 9/25/2022 at 1:04 PM

## 2022-09-26 VITALS
SYSTOLIC BLOOD PRESSURE: 141 MMHG | BODY MASS INDEX: 34.65 KG/M2 | WEIGHT: 255.8 LBS | OXYGEN SATURATION: 97 % | HEIGHT: 72 IN | TEMPERATURE: 97.5 F | RESPIRATION RATE: 18 BRPM | DIASTOLIC BLOOD PRESSURE: 78 MMHG | HEART RATE: 69 BPM

## 2022-09-26 PROBLEM — J12.82 PNEUMONIA DUE TO COVID-19 VIRUS: Status: ACTIVE | Noted: 2022-09-26

## 2022-09-26 PROBLEM — J96.01 ACUTE RESPIRATORY FAILURE WITH HYPOXIA (HCC): Status: RESOLVED | Noted: 2022-09-24 | Resolved: 2022-09-26

## 2022-09-26 PROBLEM — J96.01 ACUTE HYPOXEMIC RESPIRATORY FAILURE (HCC): Status: ACTIVE | Noted: 2022-09-26

## 2022-09-26 PROBLEM — U07.1 PNEUMONIA DUE TO COVID-19 VIRUS: Status: ACTIVE | Noted: 2022-09-26

## 2022-09-26 PROBLEM — J44.9 CHRONIC OBSTRUCTIVE PULMONARY DISEASE (HCC): Status: ACTIVE | Noted: 2022-09-26

## 2022-09-26 LAB
ANION GAP SERPL CALCULATED.3IONS-SCNC: 9 MMOL/L (ref 3–16)
BASOPHILS ABSOLUTE: 0 K/UL (ref 0–0.2)
BASOPHILS RELATIVE PERCENT: 0.1 %
BUN BLDV-MCNC: 22 MG/DL (ref 7–20)
CALCIUM SERPL-MCNC: 9.2 MG/DL (ref 8.3–10.6)
CHLORIDE BLD-SCNC: 107 MMOL/L (ref 99–110)
CO2: 25 MMOL/L (ref 21–32)
CREAT SERPL-MCNC: 0.7 MG/DL (ref 0.9–1.3)
EOSINOPHILS ABSOLUTE: 0.2 K/UL (ref 0–0.6)
EOSINOPHILS RELATIVE PERCENT: 1 %
GFR AFRICAN AMERICAN: >60
GFR NON-AFRICAN AMERICAN: >60
GLUCOSE BLD-MCNC: 131 MG/DL (ref 70–99)
GLUCOSE BLD-MCNC: 144 MG/DL (ref 70–99)
HCT VFR BLD CALC: 38.2 % (ref 40.5–52.5)
HEMOGLOBIN: 12.9 G/DL (ref 13.5–17.5)
LYMPHOCYTES ABSOLUTE: 0.9 K/UL (ref 1–5.1)
LYMPHOCYTES RELATIVE PERCENT: 6.4 %
MCH RBC QN AUTO: 30.8 PG (ref 26–34)
MCHC RBC AUTO-ENTMCNC: 33.8 G/DL (ref 31–36)
MCV RBC AUTO: 91 FL (ref 80–100)
MONOCYTES ABSOLUTE: 0.9 K/UL (ref 0–1.3)
MONOCYTES RELATIVE PERCENT: 5.9 %
NEUTROPHILS ABSOLUTE: 12.8 K/UL (ref 1.7–7.7)
NEUTROPHILS RELATIVE PERCENT: 86.6 %
PDW BLD-RTO: 14.2 % (ref 12.4–15.4)
PERFORMED ON: ABNORMAL
PLATELET # BLD: 406 K/UL (ref 135–450)
PMV BLD AUTO: 8 FL (ref 5–10.5)
POTASSIUM REFLEX MAGNESIUM: 4.1 MMOL/L (ref 3.5–5.1)
RBC # BLD: 4.2 M/UL (ref 4.2–5.9)
SODIUM BLD-SCNC: 141 MMOL/L (ref 136–145)
WBC # BLD: 14.8 K/UL (ref 4–11)

## 2022-09-26 PROCEDURE — 6360000002 HC RX W HCPCS: Performed by: INTERNAL MEDICINE

## 2022-09-26 PROCEDURE — 99239 HOSP IP/OBS DSCHRG MGMT >30: CPT | Performed by: INTERNAL MEDICINE

## 2022-09-26 PROCEDURE — 2500000003 HC RX 250 WO HCPCS: Performed by: INTERNAL MEDICINE

## 2022-09-26 PROCEDURE — 36415 COLL VENOUS BLD VENIPUNCTURE: CPT

## 2022-09-26 PROCEDURE — 94640 AIRWAY INHALATION TREATMENT: CPT

## 2022-09-26 PROCEDURE — 2580000003 HC RX 258: Performed by: INTERNAL MEDICINE

## 2022-09-26 PROCEDURE — 99232 SBSQ HOSP IP/OBS MODERATE 35: CPT | Performed by: INTERNAL MEDICINE

## 2022-09-26 PROCEDURE — 6370000000 HC RX 637 (ALT 250 FOR IP): Performed by: INTERNAL MEDICINE

## 2022-09-26 PROCEDURE — 85025 COMPLETE CBC W/AUTO DIFF WBC: CPT

## 2022-09-26 PROCEDURE — A4216 STERILE WATER/SALINE, 10 ML: HCPCS | Performed by: INTERNAL MEDICINE

## 2022-09-26 PROCEDURE — 80048 BASIC METABOLIC PNL TOTAL CA: CPT

## 2022-09-26 PROCEDURE — 94761 N-INVAS EAR/PLS OXIMETRY MLT: CPT

## 2022-09-26 RX ORDER — FAMOTIDINE 20 MG/1
20 TABLET, FILM COATED ORAL 2 TIMES DAILY
Status: DISCONTINUED | OUTPATIENT
Start: 2022-09-26 | End: 2022-09-26 | Stop reason: HOSPADM

## 2022-09-26 RX ORDER — POLYETHYLENE GLYCOL 3350 17 G/17G
17 POWDER, FOR SOLUTION ORAL DAILY PRN
Qty: 527 G | Refills: 1 | Status: SHIPPED | OUTPATIENT
Start: 2022-09-26 | End: 2022-10-26

## 2022-09-26 RX ORDER — DEXAMETHASONE 6 MG/1
6 TABLET ORAL DAILY
Qty: 2 TABLET | Refills: 0 | Status: SHIPPED | OUTPATIENT
Start: 2022-09-27 | End: 2022-09-29

## 2022-09-26 RX ADMIN — LISINOPRIL 10 MG: 10 TABLET ORAL at 09:04

## 2022-09-26 RX ADMIN — SODIUM CHLORIDE: 9 INJECTION, SOLUTION INTRAVENOUS at 04:50

## 2022-09-26 RX ADMIN — ASPIRIN 81 MG: 81 TABLET, CHEWABLE ORAL at 09:05

## 2022-09-26 RX ADMIN — FAMOTIDINE 20 MG: 10 INJECTION INTRAVENOUS at 09:05

## 2022-09-26 RX ADMIN — CLOPIDOGREL BISULFATE 75 MG: 75 TABLET, FILM COATED ORAL at 09:06

## 2022-09-26 RX ADMIN — IPRATROPIUM BROMIDE AND ALBUTEROL 1 PUFF: 20; 100 SPRAY, METERED RESPIRATORY (INHALATION) at 08:29

## 2022-09-26 RX ADMIN — DEXAMETHASONE 6 MG: 4 TABLET ORAL at 09:04

## 2022-09-26 RX ADMIN — BUPROPION HYDROCHLORIDE 150 MG: 150 TABLET, EXTENDED RELEASE ORAL at 09:05

## 2022-09-26 RX ADMIN — METOPROLOL SUCCINATE 25 MG: 25 TABLET, EXTENDED RELEASE ORAL at 09:05

## 2022-09-26 RX ADMIN — PREGABALIN 200 MG: 100 CAPSULE ORAL at 13:57

## 2022-09-26 RX ADMIN — ENOXAPARIN SODIUM 30 MG: 100 INJECTION SUBCUTANEOUS at 09:06

## 2022-09-26 RX ADMIN — CETIRIZINE HYDROCHLORIDE 10 MG: 10 TABLET ORAL at 09:04

## 2022-09-26 RX ADMIN — PREGABALIN 200 MG: 100 CAPSULE ORAL at 09:04

## 2022-09-26 NOTE — DISCHARGE SUMMARY
Name:  Cj Nichole  Room:  /0908-08  MRN:    [id number]    Discharge Summary      This discharge summary is in conjunction with a complete physical exam done on the day of discharge. Attending Physician: Dr. Pablito Jansen  Discharging Physician: Dr. Eligio Laureano: 9/23/2022  Discharge:  9/26/2022    HPI:  The patient is a 46 y.o. male with pmhx of CAD, COPD, HTN, HLD, BILLIE, PAD who presented to [facility] ED with complaint of generally not feeling well for the past 2 weeks. His main complaint is his abdominal pain that has progressively gotten worse since Friday. Pain is diffuse sharp stabbing and cramping. He has also had associated diarrhea, multiple episodes daily. No blood or black stool. No pain with bowel movements. He denies any vomiting but has felt nauseous, and has not been able to eat or drink much lately 2/2 to poor appetite and pain. Feels like his abdomen has become more distended. He is also complaining of vague symptoms of fatigue, weakness, light-headedness and shortness of breath with exertion. No dizziness or syncope. No falls at home. He has had a productive cough with green white sputum for 2 weeks that has progressively gotten better but is still present. Had on episode of sharp left sided chest pain that self terminated and is not present currently. No fever or chills. No hematuria or dysuria. His daughter are also sick with similar symptoms. He does smoke cigarettes. Does not wear oxygen at home. Diagnoses this Admission and Hospital Course   #COVID + pneumonia   -droplet + precautions   - procal negative.   -robitussin prn and combivent   Bacterial pneumonia ruled out. Acute hypoxic respiratory failure ruled out. O2 sat stable on room air. Received 3 days of Decadron. We will give 2 more days of Decadron on discharge. #Chest pain   Serial trop negative.    -EKG with septal anterolateral changes - non specific.   -cards consulted, echo as below  -KWASI has resolved     #CAD  -s/p stents   -ASA, statin, plavix, BB, imdur     #Enterocolitis vs ileus   -pt with diarrhea and leukocytosis  -cipro flagyl now stopped due to suspected allergic reaction.   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  -environmental precautions   -permethrin treatment     #Chronic wound to right lower extremity   -patient reports this is actually improved   -wound care consulted      #HTN   -on lisinopril and toprol XL      #HLD  -on statin      #PAD   -s/p aortobifemoral bypass   -s/p stents   -follows with vascular surgery   -on ASA, statin, plavix      #Carotid artery stenosis   -follows with vascular surgery     #BILLIE    -home BIPAP     #Chronic pain   -on percocet and lyrica   -OARRS verified      #Tobacco abuse   -recommend cessation         DVT Prophylaxis: Lovenox     Procedures (Please Review Full Report for Details)  N/A    Consults    Pulmonology  GI  Wound care RN      Physical Exam at Discharge:    BP (!) 141/78   Pulse 69   Temp 97.5 °F (36.4 °C) (Axillary)   Resp 18   Ht 6' (1.829 m)   Wt 255 lb 12.8 oz (116 kg)   SpO2 97%   BMI 34.69 kg/m²     Gen: No distress. Alert. oriented . +chronically ill appearing   Eyes: PERRL. No sclera icterus. No conjunctival injection. Neck: No JVD. No Carotid Bruit. Trachea midline. Resp: No accessory muscle use. No crackles, no wheezes . . +diminished breath sounds   CV: Regular rate. Regular rhythm. No murmur. No rub. No edema. Peripheral Pulses: cool extremities and very weak peripheral pulses bilaterally   GI: No masses. No organomegaly. No hernia. +hyperactive bowel sounds, +distended but soft, diffuse tenderness  Skin: Warm and dry. No nodule on exposed extremities.  +erythematous bites on nape of neck, scattered bites on scalp and behind the ears bilaterally, +right leg with chronic wound with crusting and erythema,+erythema in panus area   M/S: No cyanosis. No joint deformity. No clubbing. Neuro: Awake. Grossly nonfocal    Psych: Oriented x 3. No anxiety or agitation. CBC:   Recent Labs     09/24/22  0506 09/25/22  0423 09/26/22  0428   WBC 18.4* 21.6* 14.8*   HGB 14.3 13.4* 12.9*   HCT 42.4 39.7* 38.2*   MCV 90.0 90.1 91.0    438 406     BMP:   Recent Labs     09/24/22  0506 09/25/22  0423 09/26/22  0428    138 141   K 3.9 4.0 4.1    104 107   CO2 20* 23 25   BUN 30* 33* 22*   CREATININE 1.1 1.0 0.7*         CARDIAC ENZYMES  Recent Labs     09/23/22 2039 09/23/22  2305 09/24/22  0158   TROPONINI <0.01 <0.01 <0.01       U/A:    Lab Results   Component Value Date/Time    NITRITE NEG 07/25/2012 08:33 PM    COLORU Yellow 09/23/2022 12:08 PM    WBCUA 6-9 09/23/2022 12:08 PM    RBCUA 3-4 09/23/2022 12:08 PM    MUCUS 4+ 09/23/2022 12:08 PM    BACTERIA Rare 09/23/2022 12:08 PM    CLARITYU Clear 09/23/2022 12:08 PM    SPECGRAV 1.025 09/23/2022 12:08 PM    LEUKOCYTESUR Negative 09/23/2022 12:08 PM    BLOODU Negative 09/23/2022 12:08 PM    GLUCOSEU Negative 09/23/2022 12:08 PM    AMORPHOUS 1+ 09/23/2022 12:08 PM         CULTURES  GI panel: negative  C-diff: negative  Blood: NGTD  COVID: detected  Flu: not detected    RADIOLOGY  XR ABDOMEN (KUB) (SINGLE AP VIEW)   Final Result   Generalized ileus pattern, perhaps with mild improvement. CT ABDOMEN PELVIS W IV CONTRAST Additional Contrast? None   Final Result   1. Negative for pulmonary embolus. 2. Multifocal bilateral atypical pneumonia. 3. Mild ileus versus enterocolitis. 4. Improving pelvic adenopathy. CT CHEST PULMONARY EMBOLISM W CONTRAST   Final Result   1. Negative for pulmonary embolus. 2. Multifocal bilateral atypical pneumonia. 3. Mild ileus versus enterocolitis. 4. Improving pelvic adenopathy.          XR CHEST PORTABLE   Final Result   Diffuse bilateral airspace disease reflecting pulmonary edema versus   pneumonia. Cardiomegaly. Echo 9/24/22   Summary   Definity contrast administered. Imaging is technically due to lung interface even with Definity. Left ventricular systolic function is normal with EF estimated at 55%. Endocardium not entirely well visualized but no obvious segmental wall   motion abnormalities. There is mild concentric left ventricular hypertrophy. Normal left ventricular diastolic filling pressure. Valves not well visualized but no obvious structural abnormalities or color   flow abnormalities noted on doppler. Normal systolic pulmonary artery pressure (SPAP) estimated at 23 mmHg (RA   pressure 3 mmHg).       Discharge Medications     Medication List        START taking these medications      albuterol-ipratropium  MCG/ACT Aers inhaler  Commonly known as: COMBIVENT RESPIMAT  Inhale 1 puff into the lungs every 4 hours as needed for Wheezing     dexamethasone 6 MG tablet  Commonly known as: DECADRON  Take 1 tablet by mouth daily for 2 days  Start taking on: September 27, 2022     polyethylene glycol 17 g packet  Commonly known as: GLYCOLAX  Take 17 g by mouth daily as needed for Constipation            CONTINUE taking these medications      aspirin 81 MG chewable tablet  Take 1 tablet by mouth daily     atorvastatin 80 MG tablet  Commonly known as: LIPITOR  Take 1 tablet by mouth nightly     B-Complex/Vitamin C (w/ Ca) Tabs     buPROPion 150 MG extended release tablet  Commonly known as: WELLBUTRIN SR     carvedilol 12.5 MG tablet  Commonly known as: COREG     clopidogrel 75 MG tablet  Commonly known as: PLAVIX     diclofenac sodium 1 % Gel  Commonly known as: VOLTAREN     furosemide 40 MG tablet  Commonly known as: LASIX     lisinopril 40 MG tablet  Commonly known as: PRINIVIL;ZESTRIL     nitroGLYCERIN 0.4 MG SL tablet  Commonly known as: NITROSTAT     ondansetron 4 MG disintegrating tablet  Commonly known as: Zofran ODT  Take 1 tablet by mouth every 8 hours as needed for Nausea or Vomiting     oxyCODONE-acetaminophen 7.5-325 MG per tablet  Commonly known as: PERCOCET     potassium chloride 10 MEQ extended release capsule  Commonly known as: MICRO-K     pregabalin 25 MG capsule  Commonly known as: LYRICA     therapeutic multivitamin-minerals tablet     ticagrelor 90 MG Tabs tablet  Commonly known as: BRILINTA  Take 1 tablet by mouth 2 times daily     vitamin D3 125 MCG (5000 UT) Tabs tablet  Commonly known as: CHOLECALCIFEROL     vitamin E 400 UNIT capsule  Take 1 capsule by mouth 2 times daily     ZYRTEC PO            STOP taking these medications      methocarbamol 500 MG tablet  Commonly known as: ROBAXIN               Where to Get Your Medications        You can get these medications from any pharmacy    Bring a paper prescription for each of these medications  albuterol-ipratropium  MCG/ACT Aers inhaler  dexamethasone 6 MG tablet  polyethylene glycol 17 g packet           Discharged in stable condition to home   D/C home with [facility] AT [facility]. Total time 35 minutes. > 50%  dominated by counseling and coordination of care. Follow Up: Follow up with PCP.     Bella Sherman MD

## 2022-09-26 NOTE — PROGRESS NOTES
Pulmonary Progress Note  CC: COVID 19 pneumonia    Subjective:    Feels better  Room air      EXAM: /81   Pulse 54   Temp 96.8 °F (36 °C) (Oral)   Resp 16   Ht 6' (1.829 m)   Wt 255 lb 12.8 oz (116 kg)   SpO2 96%   BMI 34.69 kg/m²  on 2L  Constitutional:  No acute distress. Eyes: PERRL. Conjunctivae anicteric. ENT: Normal nose. Normal tongue. Neck:  Trachea is midline. No thyroid tenderness. Respiratory: No accessory muscle usage. Decreased breath sounds. No wheezes. Few rales. No Rhonchi. Cardiovascular: Normal S1S2. No digit clubbing. No digit cyanosis. No LE edema. Psychiatric: No anxiety or Agitation. Alert and Oriented to person, place and time.     Scheduled Meds:   cetirizine  10 mg Oral Daily    lisinopril  10 mg Oral Daily    dexamethasone  6 mg Oral Daily    albuterol-ipratropium  1 puff Inhalation BID    aspirin  81 mg Oral Daily    clopidogrel  75 mg Oral Daily    metoprolol succinate  25 mg Oral Daily    atorvastatin  80 mg Oral Nightly    buPROPion  150 mg Oral BID    pregabalin  200 mg Oral TID    sodium chloride flush  5-40 mL IntraVENous 2 times per day    enoxaparin  30 mg SubCUTAneous BID    permethrin   Topical Once    famotidine (PEPCID) injection  20 mg IntraVENous BID     Continuous Infusions:   sodium chloride      sodium chloride 75 mL/hr at 09/26/22 0450     PRN Meds:  diphenhydrAMINE, oxyCODONE-acetaminophen, sodium chloride flush, sodium chloride, potassium chloride **OR** potassium alternative oral replacement **OR** potassium chloride, magnesium sulfate, ondansetron **OR** ondansetron, polyethylene glycol, acetaminophen **OR** acetaminophen, guaiFENesin-dextromethorphan, albuterol-ipratropium    Labs:  CBC:   Recent Labs     09/24/22  0506 09/25/22 0423 09/26/22 0428   WBC 18.4* 21.6* 14.8*   HGB 14.3 13.4* 12.9*   HCT 42.4 39.7* 38.2*   MCV 90.0 90.1 91.0    438 406       BMP:   Recent Labs     09/24/22  0506 09/25/22 0423 09/26/22 0428    138 141   K 3.9 4.0 4.1    104 107   CO2 20* 23 25   BUN 30* 33* 22*   CREATININE 1.1 1.0 0.7*       COVID detected  Influenza not detected   Stool studies pending      CT chest 9/23  1. Negative for pulmonary embolus. 2. Multifocal bilateral atypical pneumonia. ASSESSMENT:  Acute hypoxic respiratory failure  COVID 19 pneumonia  COPD not on home O2  Tobacco abuse  CAD with PCI  Cardiomyopathy with EF 40%  Anaphylaxis to cipro or flagyl -resolved     PLAN:  Droplet Plus Airborne Precautions   Supplemental oxygen to keep saturation greater than 92%  Inhaled bronchodilators  Decadron 6 mg QD, D#3, monitor glucose closely  Tobacco cessation  Lovenox twice daily   I recommend CXR in 4-6 week to document resolution of abnormalities and follow up with PCP.   DC plan is okay with pulmonary

## 2022-09-26 NOTE — PROGRESS NOTES
Perfect Serve to Dr Zafar Ayala- to ask if IV can stay out. Pt accidentally removed with repositioning.

## 2022-09-26 NOTE — CARE COORDINATION
Case Management Assessment  Initial Evaluation      Patient Name: Saadia Monterroso  YOB: 1970  Diagnosis: Metabolic acidosis [J88.8]  Chest pain [R07.9]  T wave inversion in EKG [R94.31]  COVID [U07.1]  Date / Time: 9/23/2022  8:53 AM    Admission status/Date:09/23/2022 Inpatient   Chart Reviewed: Yes      Patient Interviewed: Yes   Family Interviewed:  No      Hospitalization in the last 30 days:  No    Health Care Decision Maker :  pt declined    Met with:  pt   Interview conducted  (bedside/phone): bedside phone    Current PCP: PHANI Go CNP but he stated she is leaving the practice and he is going to find another MD. Information placed on the AVS for finding MD's    Financial  [employer]  Precert required for SNF : Y          3 night stay required -  N    ADLS  Support Systems/Care Needs: None  Transportation: self    Meal Preparation: self    Housing  Living Arrangements: pt lives at home alone  Steps: 0  Intent for return to present living arrangements: Yes  Identified Issues: [address] [place] [address] with [address] : No Agency:(Services)  Type of Home Care Services: None  Passport/Waiver : No  :                      Phone Number:    Passport/Waiver Services: n/a          Durable Medical Equiptment   DME Provider: n/a  Equipment: n/a    Home O2 Use :  No; 97% on room air per vitals [facility]   Dialysis:  No    Agency:  Location:  Dialysis Schedule:  Phone:   Fax: Other Community Services: n/a    DISCHARGE PLAN: Explained Case Management role/services. Chart review completed. Spoke with pt via call to bedside phone due to covid isolation. Pt stated he is independent at home and will return when discharged. Addressed the social work order with pt and he stated that he has no concerns of being home alone. He stated that on the weekends, he is with his kids and they are a good support for him.  He stated that he gets lonely at times but has no concerns or safety concerns at home. Pt aware that there could be activities in the community, etc that he could do and he again stated his kids keep him busy. Discussed skilled Ahmet Bethea for RN/OT/PT and pt declined stating he doesn't need this. He stated that he has been told \"he doesn't hurt enough\" to get into a pain management MD.     PT/OT recommendations are continue to assess. CM will follow. Please notify CM if needs or concerns arise.      Natalee Epperson MSW, ABIODUN-S

## 2022-09-26 NOTE — PROGRESS NOTES
Physician Progress Note      Tita Sinclair  [facility] #:                  [id number]  :                       1970  ADMIT DATE:       2022 8:53 AM  DISCH DATE:  RESPONDING  PROVIDER #:        Campbell Bennett MD          QUERY TEXT:    Patient admitted with Covid pneumonia. Documentation reflects concern for   bacterial pneumonia in the h/p and then dropped from documentation. Procalcitonin-0.10. If possible, please document in the progress notes and   discharge summary if bacterial pneumonia was: The medical record reflects the following:  Risk Factors: covid 19 pna, copd, CM  Clinical Indicators: per h/p: with leukocytosis, concerned for superimposed   bacterial pneumonia, procalcitonin pending  Treatment: Rocephin, zithromax-dc'd, imaging, isolation    Thank you for your assistance,  Johana Brenner RN,BSN,CCDS,CRCR  Options provided:  -- Bacterial pneumonia ruled out after study  -- Bacterial pneumonia treated and resolved  -- Other - I will add my own diagnosis  -- Disagree - Not applicable / Not valid  -- Disagree - Clinically unable to determine / Unknown  -- Refer to Clinical Documentation Reviewer    PROVIDER RESPONSE TEXT:    Bacterial pneumonia ruled out after study. Query created by: Nickolas Read on 2022 6:39 AM      QUERY TEXT:    Patient admitted with Covid +pneumonia. Noted documentation of acute   respiratory failure in the pulmonary consult note. Patient only required 2L   and the documentation does not note respiratory distress or increased work of   breathing. In order to support the diagnosis of acute respiratory failure,   please include additional clinical indicators in your documentation. Or   please document if the diagnosis of acute respiratory failure has been ruled   out after further study. The medical record reflects the following:  Risk Factors: covid +pna, copd, cad, CM  Clinical Indicators: No accessory muscle usage.   decreased breath sounds. +   wheezes. No rales. No Rhonchi, Resp: No accessory muscle use. No crackles. +diminished breath sounds with rhonchi and wheezing throughout, some labored   breathing, currently on RA sating 92%  Treatment: 2L oxygen, droplet precautions, combivent, decadron    Acute Respiratory Failure Clinical Indicators per [employer] Training Guide and   Quick Reference Guide:  pO2 < 60 mmHg or SpO2 (pulse oximetry) < 91% breathing room air  pCO2 > 50 and pH < 7.35  P/F ratio (pO2 / FIO2) < 300  pO2 decrease or pCO2 increase by 10 mmHg from baseline (if known)  Supplemental oxygen of 40% or more  Presence of respiratory distress, tachypnea, dyspnea, shortness of breath,   wheezing  Unable to speak in complete sentences  Use of accessory muscles to breathe  Extreme anxiety and feeling of impending doom  Tripod position  Confusion/altered mental status/obtunded    Thank you for your assistance,  Sera Rivero RN,BSN,CCDS,CRCR  Options provided:  -- Acute Respiratory Failure as evidenced by, Please document evidence. -- Acute Respiratory Failure ruled out after study  -- Other - I will add my own diagnosis  -- Disagree - Not applicable / Not valid  -- Disagree - Clinically unable to determine / Unknown  -- Refer to Clinical Documentation Reviewer    PROVIDER RESPONSE TEXT:    Acute Respiratory Failure has been ruled out after study.     Query created by: Danuta Faria on 9/26/2022 6:44 AM      Electronically signed by:  Julia Saucedo MD 9/26/2022 2:23 PM

## 2022-09-26 NOTE — PLAN OF CARE
Problem: Discharge Planning  Goal: Discharge to home or other facility with appropriate resources  9/26/2022 1048 by Juan Soares RN  Outcome: Progressing  9/25/2022 2346 by Anastasia Vera RN  Outcome: Progressing     Problem: Pain  Goal: Verbalizes/displays adequate comfort level or baseline comfort level  9/26/2022 1048 by Juan Soares RN  Outcome: Progressing  9/25/2022 2346 by Anastasia Vera RN  Outcome: Progressing     Problem: ABCDS Injury Assessment  Goal: Absence of physical injury  9/26/2022 1048 by Juan Soares RN  Outcome: Progressing  9/25/2022 2346 by Anastasia Vera RN  Outcome: Progressing     Problem: Nutrition Deficit:  Goal: Optimize nutritional status  9/26/2022 1048 by Juan Soares RN  Outcome: Progressing  9/25/2022 2346 by Anastasia Vera RN  Outcome: Progressing     Problem: Skin/Tissue Integrity  Goal: Absence of new skin breakdown  Description: 1. Monitor for areas of redness and/or skin breakdown  2. Assess vascular access sites hourly  3. Every 4-6 hours minimum:  Change oxygen saturation probe site  4. Every 4-6 hours:  If on nasal continuous positive airway pressure, respiratory therapy assess nares and determine need for appliance change or resting period.   Outcome: Progressing

## 2022-09-26 NOTE — ACP (ADVANCE CARE PLANNING)
Advance Care Planning     General Advance Care Planning (ACP) Conversation    Date of Conversation: 9/23/2022  Conducted with: Patient with Decision Making Capacity    Pt declined the ACP conversation and declined listing emergency contacts     Length of Voluntary ACP Conversation in minutes:  <16 minutes (Non-Billable)    Stone JACOBSON, RADHA

## 2022-09-26 NOTE — PROGRESS NOTES
Shift assessment was completed (see flow sheet). Pt is A/O, denies any pain or other needs at this time. IV Site reinforced. Respirations are even, unlabored, with Diminished/expiratory wheeze in bases sounds. On RA. Scheduled medications to follow- whole with Water. Infusing:  NS (see MAR). Call light within reach. Bed in lowest position. Bed alarm on. Will continue to monitor. Patient is able to demonstrate the ability to move from a reclining position to an upright position within the recliner.

## 2022-09-26 NOTE — DISCHARGE INSTRUCTIONS
[facility] find a Physician line [phone]. Please call them to find a physician. They accept patients that have insurance. .       [facility] OFFICE - Follow-up appointment on October 31, 2022 at 9:15 am with Kisha Larios NP. Please arrive 15 minutes early to complete necessary paperwork. You and your one visitor will need to have your mouth and nose covered with a mask. No children please. [facility] Office [address] [place] [address] [address]:  [phone]. If you are unable to make this appointment, please call to reschedule [phone]. To get to the office go to the side of the hospital at [facility], enter at the [facility], entrance that faces [address]. Take the elevator to the 3rd floor turn right off elevator then take hallway to the right. Go down the andujar and office will be on your right [address].

## 2022-09-26 NOTE — PROGRESS NOTES
Progress Note    Patient Shaheen Christianson  MRN: [id number]  YOB: 1970 Age: 46 y.o. Sex: male  Room: [place] [address]       Admitting Physician: Megan Skinner MD   Date of Admission: 9/23/2022  8:53 AM   Primary Care Physician: PHANI Jain CNP     Subjective: Shaheen Christianson was seen and examined. We are following for ileus. -- No acute events overnight  -- Reports overall feeling better  -- Denies abdominal pain and nausea/vomiting  -- Having bowel function    ROS:  Constitutional: Denies fever, no change in appetite  Respiratory: Denies cough or shortness of breath  Cardiovascular: Denies chest pain or edema    Objective:  Vital Signs:   Vitals:    09/26/22 0905   BP: (!) 141/78   Pulse: 69   Resp:    Temp:    SpO2:          Physical Exam:  Constitutional: Alert and oriented x 4. No acute distress. Respiratory: Respirations nonlabored, no crepitus  GI: Abdomen nondistended, soft, and nontender. Neurological: No focal deficits noted. No asterixis.     Intake/Output:    Intake/Output Summary (Last 24 hours) at 9/26/2022 8562  Last data filed at 9/26/2022 2844  Gross per 24 hour   Intake 2681.71 ml   Output --   Net 2681.71 ml        Current Medications:  Current Facility-Administered Medications   Medication Dose Route Frequency Provider Last Rate Last Admin    diphenhydrAMINE (BENADRYL) tablet 50 mg  50 mg Oral Q6H PRN Megan Skinner MD        cetirizine (ZYRTEC) tablet 10 mg  10 mg Oral Daily Megan Skinner MD   10 mg at 09/26/22 0904    lisinopril (PRINIVIL;ZESTRIL) tablet 10 mg  10 mg Oral Daily Megan Skinner MD   10 mg at 09/26/22 0904    dexamethasone (DECADRON) tablet 6 mg  6 mg Oral Daily Megan Skinner MD   6 mg at 09/26/22 0904    albuterol-ipratropium (COMBIVENT RESPIMAT)  MCG/ACT inhaler 1 puff  1 puff Inhalation BID Charo Velázquez MD   1 puff at 09/26/22 0829    aspirin chewable tablet 81 mg  81 mg Oral Daily Megan Skinner MD   81 mg at 09/26/22 0905    clopidogrel (PLAVIX) tablet 75 mg  75 mg Oral Daily Dawit Carreno MD   75 mg at 09/26/22 0906    metoprolol succinate (TOPROL XL) extended release tablet 25 mg  25 mg Oral Daily Dawit Carreno MD   25 mg at 09/26/22 0905    atorvastatin (LIPITOR) tablet 80 mg  80 mg Oral Nightly Dawit Carreno MD   80 mg at 09/25/22 2034    buPROPion [facility]) extended release tablet 150 mg  150 mg Oral BID Dawit Carreno MD   150 mg at 09/26/22 0905    oxyCODONE-acetaminophen (PERCOCET) 7.5-325 MG per tablet 1 tablet  1 tablet Oral Q12H PRN Dawit Carreno MD   1 tablet at 09/25/22 1023    pregabalin (LYRICA) capsule 200 mg  200 mg Oral TID Dawit Carreno MD   200 mg at 09/26/22 0904    sodium chloride flush 0.9 % injection 5-40 mL  5-40 mL IntraVENous 2 times per day Dawit Carreno MD   10 mL at 09/25/22 2037    sodium chloride flush 0.9 % injection 10 mL  10 mL IntraVENous PRN Dawit Carreno MD        0.9 % sodium chloride infusion   IntraVENous PRN Dawit Carreno MD        potassium chloride (KLOR-CON M) extended release tablet 40 mEq  40 mEq Oral PRN Dawit Carreno MD        Or    potassium bicarb-citric acid (EFFER-K) effervescent tablet 40 mEq  40 mEq Oral PRN Dawit Carreno MD        Or    potassium chloride 10 mEq/100 mL IVPB (Peripheral Line)  10 mEq IntraVENous PRN Dawit Carreno MD        magnesium sulfate 1000 mg in dextrose 5% 100 mL IVPB  1,000 mg IntraVENous PRN Dawit Carreno MD        enoxaparin Sodium (LOVENOX) injection 30 mg  30 mg SubCUTAneous BID Dawit Carreno MD   30 mg at 09/26/22 0906    ondansetron (ZOFRAN-ODT) disintegrating tablet 4 mg  4 mg Oral Q8H PRN Dawit Carreno MD   4 mg at 09/24/22 2032    Or    ondansetron (ZOFRAN) injection 4 mg  4 mg IntraVENous Q6H PRN Dawit Carreno MD   4 mg at 09/24/22 0024    polyethylene glycol (GLYCOLAX) packet 17 g  17 g Oral Daily PRN Jigar Murcia MD        acetaminophen (TYLENOL) tablet 650 mg  650 mg Oral Q6H PRN Jigar Murcia MD        Or    acetaminophen (TYLENOL) suppository 650 mg  650 mg Rectal Q6H PRN Jigar Murcia MD        0.9 % sodium chloride infusion   IntraVENous Continuous Jigar Murcia MD 75 mL/hr at 09/26/22 0909 Rate Verify at 09/26/22 0909    guaiFENesin-dextromethorphan (ROBITUSSIN DM) 100-10 MG/5ML syrup 5 mL  5 mL Oral Q4H PRN Jigar Murcia MD   5 mL at 09/24/22 0057    permethrin (NIX) 1 % liquid   Topical Once Jigar uMrcia MD        famotidine (PEPCID) 20 mg in sodium chloride (PF) 10 mL injection  20 mg IntraVENous BID Jigar Murcia MD   20 mg at 09/26/22 4047    albuterol-ipratropium (COMBIVENT RESPIMAT)  MCG/ACT inhaler 1 puff  1 puff Inhalation Q4H PRN Jigar Murcia MD   1 puff at 09/24/22 1546         Recent Imaging:   XR ABDOMEN (KUB) (SINGLE AP VIEW)  Narrative: EXAMINATION:  ONE SUPINE XRAY VIEW(S) OF THE ABDOMEN    9/25/2022 8:49 am    COMPARISON:  CT abdomen and pelvis,, 09/10/2021    HISTORY:  ORDERING SYSTEM PROVIDED HISTORY: ileus  TECHNOLOGIST PROVIDED HISTORY:  Reason for exam:->ileus  Reason for Exam: ileus    FINDINGS:  Small bowel and colon are gaseous with mild diffuse distension, mildly  decreased. Small amount of gas is in the rectum. No pneumatosis or free air  is seen. Ground-glass opacities are again noted in the lung bases. Impression: Generalized ileus pattern, perhaps with mild improvement. Labs:   Recent Labs     09/24/22  0506 09/25/22  0423 09/26/22  0428   HGB 14.3 13.4* 12.9*   WBC 18.4* 21.6* 14.8*          Assessment:    46year old M with PMH significant for CAD, cardiomyopathy COPD, HTN, sleep apnea, and PAD. Patient presented with complaints of diarrhea, back and abdominal pain, and shortness of breath x 1 week. Found to have COVID pneumonia. Suspect patient has colonic ileus.     Overall feeling better. Tolerating PO intake. Having bowel function. Plan:  Follow up fecal leukocytes if obtained prior to discharge  Okay for discharge from GI standpoint  Follow up in GI clinic in 4-6 weeks  Supportive care       Valeria Hernandez, APRN - [facility]    [phone].  Also available via Perfect Serve

## 2022-09-26 NOTE — PROGRESS NOTES
09/25/22 2000   RT Protocol   History Pulmonary Disease 2   Respiratory pattern 0   Breath sounds 2   Cough 0   Indications for Bronchodilator Therapy Decreased or absent breath sounds   Bronchodilator Assessment Score 4   RT Inhaler-Nebulizer Bronchodilator Protocol Note    There is a bronchodilator order in the chart from a provider indicating to follow the RT Bronchodilator Protocol and there is an Initiate RT Inhaler-Nebulizer Bronchodilator Protocol order as well (see protocol at bottom of note). CXR Findings:  No results found. The findings from the last RT Protocol Assessment were as follows:   History Pulmonary Disease: Chronic pulmonary disease  Respiratory Pattern: Regular pattern and RR 12-20 bpm  Breath Sounds: Slightly diminished and/or crackles  Cough: Strong, spontaneous, non-productive  Indication for Bronchodilator Therapy: Decreased or absent breath sounds  Bronchodilator Assessment Score: 4    Aerosolized bronchodilator medication orders have been revised according to the RT Inhaler-Nebulizer Bronchodilator Protocol below. Respiratory Therapist to perform RT Therapy Protocol Assessment initially then follow the protocol. Repeat RT Therapy Protocol Assessment PRN for score 0-3 or on second treatment, BID, and PRN for scores above 3. No Indications - adjust the frequency to every 6 hours PRN wheezing or bronchospasm, if no treatments needed after 48 hours then discontinue using Per Protocol order mode. If indication present, adjust the RT bronchodilator orders based on the Bronchodilator Assessment Score as indicated below. Use Inhaler orders unless patient has one or more of the following: on home nebulizer, not able to hold breath for 10 seconds, is not alert and oriented, cannot activate and use MDI correctly, or respiratory rate 25 breaths per minute or more, then use the equivalent nebulizer order(s) with same Frequency and PRN reasons based on the score.   If a patient is on this medication at home then do not decrease Frequency below that used at home. 0-3 - enter or revise RT bronchodilator order(s) to equivalent RT Bronchodilator order with Frequency of every 4 hours PRN for wheezing or increased work of breathing using Per Protocol order mode. 4-6 - enter or revise RT Bronchodilator order(s) to two equivalent RT bronchodilator orders with one order with BID Frequency and one order with Frequency of every 4 hours PRN wheezing or increased work of breathing using Per Protocol order mode. 7-10 - enter or revise RT Bronchodilator order(s) to two equivalent RT bronchodilator orders with one order with TID Frequency and one order with Frequency of every 4 hours PRN wheezing or increased work of breathing using Per Protocol order mode. 11-13 - enter or revise RT Bronchodilator order(s) to one equivalent RT bronchodilator order with QID Frequency and an Albuterol order with Frequency of every 4 hours PRN wheezing or increased work of breathing using Per Protocol order mode. Greater than 13 - enter or revise RT Bronchodilator order(s) to one equivalent RT bronchodilator order with every 4 hours Frequency and an Albuterol order with Frequency of every 2 hours PRN wheezing or increased work of breathing using Per Protocol order mode.        Electronically signed by Julio Keane RCP on 9/25/2022 at 8:12 PM

## 2022-09-26 NOTE — PROGRESS NOTES
09/25/22 2000   RT Protocol   History Pulmonary Disease 2   Respiratory pattern 0   Breath sounds 2   Cough 0   Indications for Bronchodilator Therapy Decreased or absent breath sounds   Bronchodilator Assessment Score 4

## 2022-09-26 NOTE — FLOWSHEET NOTE
09/25/22 2029   Assessment   Charting Type Shift assessment   Psychosocial   Psychosocial (WDL) WDL   Neurological   Neuro (WDL) WDL   Level of Consciousness 0   Orientation Level Oriented X4   Cognition Appropriate safety awareness; Appropriate judgement; Appropriate attention/concentration; Appropriate for developmental age; Follows commands   Speech Clear; Appropriate for developmental age   [de-identified] Coma Scale   Eye Opening 4   Best Verbal Response 5   Best Motor Response 6   Shirley Coma Scale Score 15   HEENT (Head, Ears, Eyes, Nose, & Throat)   HEENT (WDL) X   Right Eye Glasses; Impaired vision   Left Eye Glasses; Impaired vision   Respiratory   Respiratory (WDL) WDL   Respiratory Interventions H.O.B. elevated   Respiratory Pattern Regular   Respiratory Depth Normal   Respiratory Quality/Effort Dyspnea with exertion   Chest Assessment Chest expansion symmetrical;Trachea midline   L Breath Sounds Clear;Diminished   R Breath Sounds Clear;Diminished   Cardiac   Cardiac (WDL) WDL   Cardiac Rhythm Sinus rhythm   Cardiac Monitor   Telemetry Box Number PCU   Telemetry Monitor Alarm Parameters PCU   Gastrointestinal   Abdominal (WDL) WDL   Genitourinary   Genitourinary (WDL) WDL   Peripheral Vascular   Peripheral Vascular (WDL) WDL   Skin Integumentary    Skin Integumentary (WDL) X   Skin Color Red   Skin Condition/Temp Dry;Flaky; Other (comment)  (venous)   Skin Integrity Abrasion;Ecchymosis   Location BLE; scattered   Nails X   Multiple Skin Integrity Sites Yes   Nail Condition Thick   Wound 09/24/22 Pretibial Right; Anterior large scabbed area R shin   Date First Assessed/Time First Assessed: 09/24/22 0100   Present on Hospital Admission: Yes  Primary Wound Type: Venous Ulcer  Location: Pretibial  Wound Location Orientation: Right; Anterior  Wound Description (Comments): large scabbed area R shin   Wound Etiology Venous   Dressing/Treatment Open to air   Drainage Amount None   Margins Attached edges   Wound Thickness

## 2022-09-26 NOTE — CONSULTS
[facility] Wound Ostomy Continence Nurse  Consult Note       NAME:  Caio Thurston RECORD NUMBER:  [id number]  AGE: 46 y.o. GENDER: male  : 1970  TODAY'S DATE:  2022    Subjective Pt alert, cooperative with care. Able to provide wound history   Reason for WOCN Evaluation and Assessment: BLE, Right heel, buttocks      Claudio Webber 79. is a 46 y.o. male referred by:   [x] Physician  [x] Nursing  [] Other:     Pt seen for wound care. Pt states scabs on RLE have been present x several years. He states the LLE once had the same sort of wound. LLE does have pink maturation tissue on the anterior portion of his leg where wound once was. Pt is complaining of pain in his BLE and feet, unrelated to the wounds.           Patient Goal of Care:  [x] Wound Healing  [] Odor Control  [] Palliative Care  [] Pain Control   [] Other:         PAST MEDICAL HISTORY        Diagnosis Date    CAD (coronary artery disease)     [facility]    EF= 40%     COPD (chronic obstructive pulmonary disease) ([facility])     Hx of blood clots     Hyperlipidemia     Hypertension     MI (myocardial infarction) ([facility])     BILLIE (obstructive sleep apnea)     PAD (peripheral artery disease) ([facility])     Pulmonary nodule     S/P coronary artery stent placement     Vitamin D deficiency        PAST SURGICAL HISTORY    Past Surgical History:   Procedure Laterality Date    ABDOMINAL AORTIC ANEURYSM REPAIR      CORONARY ANGIOPLASTY WITH STENT PLACEMENT  2012    LESLY: [facility]- 4.0 x 38 to pRCA    CORONARY ANGIOPLASTY WITH STENT PLACEMENT      x 2    CORONARY ANGIOPLASTY WITH STENT PLACEMENT  2019    LESLY: [facility]- 4.0 x 12 to pRCA    OTHER SURGICAL HISTORY  2019    Bilateral Femerol Stenting and angioplasty with Dr. Thomas Javed  2020       FAMILY HISTORY    Family History   Adopted: Yes   Problem Relation Age of Onset    Heart Disease Other         dont know-pt adopted    Heart Failure Neg Hx     Asthma Neg Hx     Cancer Neg Hx     Diabetes Neg Hx     Emphysema Neg Hx     Hypertension Neg Hx        SOCIAL HISTORY    Social History     Tobacco Use    Smoking status: Every Day     Packs/day: 0.50     Years: 40.00     Pack years: 20.00     Types: Cigarettes    Smokeless tobacco: Never    Tobacco comments:     1/2ppd   Substance Use Topics    Alcohol use: Yes     Alcohol/week: 0.0 standard drinks     Comment: 6 pack day    Drug use: No       ALLERGIES    Allergies   Allergen Reactions    Codeine Hives and Swelling    Flexeril [Cyclobenzaprine Hcl] Other (See Comments)     Puts him to sleep       MEDICATIONS    No current facility-administered medications on file prior to encounter. Current Outpatient Medications on File Prior to Encounter   Medication Sig Dispense Refill    pregabalin (LYRICA) 25 MG capsule Take 25 mg by mouth 3 times daily.       buPROPion (WELLBUTRIN SR) 150 MG extended release tablet TAKE 1 TABLET BY ORAL ROUTE 2 TIMES EVERY DAY IN MORNING THEN 8 HOURS LATER      B Complex-C-Calcium (B-COMPLEX/VITAMIN C, W/ CA,) TABS TAKE 1 TABLET BY MOUTH DAILY      vitamin D3 (CHOLECALCIFEROL) 125 MCG (5000 UT) TABS tablet take 2 by Oral route  every day      clopidogrel (PLAVIX) 75 MG tablet TAKE 1 TABLET (75 MG) BY MOUTH DAILY (ORIGINAL RX WRITTEN BY DR. Kandi Paniagua [place])      diclofenac sodium (VOLTAREN) 1 % GEL apply (4G)  by topical route 4 times every day to the affected area(s)      methocarbamol (ROBAXIN) 500 MG tablet Take by mouth      furosemide (LASIX) 40 MG tablet Take by mouth      oxyCODONE-acetaminophen (PERCOCET) 7.5-325 MG per tablet TAKE 1 TABLET BY ORAL ROUTE  EVERY 12 HOURS AS NEEDED FOR PAIN      potassium chloride (MICRO-K) 10 MEQ extended release capsule Take 1 capsule by mouth      carvedilol (COREG) 12.5 MG tablet TAKE 1 TABLET BY ORAL ROUTE 2 TIMES EVERY DAY WITH FOOD      lisinopril (PRINIVIL;ZESTRIL) 40 MG tablet TAKE 1 TABLET (40 MG) BY MOUTH EVERY DAY nitroGLYCERIN (NITROSTAT) 0.4 MG SL tablet Place 0.4 mg under the tongue every 5 minutes as needed for Chest pain up to max of 3 total doses. If no relief after 1 dose, call 911.       Cetirizine HCl (ZYRTEC PO) Take by mouth      vitamin E 400 UNIT capsule Take 1 capsule by mouth 2 times daily 30 capsule 5    aspirin 81 MG chewable tablet Take 1 tablet by mouth daily 30 tablet 3    atorvastatin (LIPITOR) 80 MG tablet Take 1 tablet by mouth nightly 30 tablet 3    ticagrelor (BRILINTA) 90 MG TABS tablet Take 1 tablet by mouth 2 times daily 60 tablet 3    Multiple Vitamins-Minerals (THERAPEUTIC MULTIVITAMIN-MINERALS) tablet Take 1 tablet by mouth daily      ondansetron (ZOFRAN ODT) 4 MG disintegrating tablet Take 1 tablet by mouth every 8 hours as needed for Nausea or Vomiting 15 tablet 0       Objective    BP (!) 141/78   Pulse 69   Temp 97.5 °F (36.4 °C) (Axillary)   Resp 18   Ht 6' (1.829 m)   Wt 255 lb 12.8 oz (116 kg)   SpO2 97%   BMI 34.69 kg/m²     LABS:  WBC:    Lab Results   Component Value Date/Time    WBC 14.8 09/26/2022 04:28 AM     H/H:    Lab Results   Component Value Date/Time    HGB 12.9 09/26/2022 04:28 AM    HCT 38.2 09/26/2022 04:28 AM     PTT:    Lab Results   Component Value Date/Time    APTT 28.4 05/25/2019 08:37 AM   [APTT}  PT/INR:    Lab Results   Component Value Date/Time    PROTIME 13.4 05/28/2019 06:54 AM    INR 1.18 05/28/2019 06:54 AM     HgBA1c:    Lab Results   Component Value Date/Time    LABA1C 6.0 05/25/2019 02:55 AM       Assessment   Evans Risk Score: Evans Scale Score: 17    Patient Active Problem List   Diagnosis Code    Acute inferior myocardial infarction (HCC) I21.19    CAD (coronary artery disease) I25.10    Status post angioplasty with stent Z95.820    HTN (hypertension) I10    Trochanteric bursitis M70.60    Mediastinal lymphadenopathy R59.0    Dyspnea R06.00    Tobacco abuse Z72.0    Daytime somnolence R40.0    Obesity E66.9    Snoring R06.83    Acute transmural inferior wall MI (HCC) I21.19    Chronic alcoholism ([facility] F10.20    Ischemic rest pain of lower extremity M79.606, I99.8    Fatty liver K76.0    Chest pain R07.9    T wave inversion in EKG R82.84    Lice infestation H48.6    COVID U07.1    HLD (hyperlipidemia) E78.5    PAD (peripheral artery disease) (HCC) I73.9    Enterocolitis K52.9    Abnormal finding on EKG R94.31    Hypotension I95.9    Acute respiratory failure with hypoxia ([facility] J96.01       Measurements:  Wound 09/24/22 Pretibial Right; Anterior large scabbed area R shin (Active)   Wound Image    09/24/22 0045   Wound Etiology Venous 09/26/22 0830   Dressing/Treatment Open to air 09/26/22 0830   Drainage Amount None 09/26/22 0830   Margins Attached edges 09/26/22 0830   Wound Thickness Description not for Pressure Injury Full thickness 09/26/22 0830   Number of days: 2       Wound 09/24/22 Heel Left Black blistered areas (Active)   Wound Image    09/24/22 0045   Wound Etiology Pressure Unstageable 09/26/22 0830   Dressing/Treatment Open to air 09/26/22 0830   Number of days: 2       Wound 09/24/22 Heel Right Black blistered area R heel (Active)   Wound Image   09/24/22 0045   Wound Etiology Pressure Unstageable 09/26/22 0830   Wound Cleansed Cleansed with saline 09/25/22 1015   Drainage Amount None 09/26/22 0830   Number of days: 2       Wound 09/24/22 Buttocks Inner 2 X 2 Stage 2 pressure injury (Active)   Wound Image   09/24/22 0045   Wound Etiology Pressure Stage 2 09/26/22 0830   Wound Cleansed Cleansed with saline 09/25/22 1015   Dressing/Treatment Barrier film 09/25/22 1015   Wound Length (cm) 2 cm 09/24/22 0045   Wound Width (cm) 2 cm 09/24/22 0045   Wound Surface Area (cm^2) 4 cm^2 09/24/22 0045   Drainage Amount None 09/25/22 1015   Number of days: 2       Wound 09/24/22 Arm Distal;Left;Lower 2 X 1 scabbed wound to L wrist (Active)   Wound Image   09/24/22 0045   Wound Cleansed Cleansed with saline 09/25/22 1015   Dressing/Treatment Open to air 09/25/22 1015   Number of days: 2      Right heel:  3x1.5cm, dry, reabsorbed, firm blood blister. No signs of infection. RLE:  dry thick scab, edges lifting. Able to remove proximal portion, intact pink maturation tissue revealed. Pt has redness around rectum, no signs of pressure. Improved from admit photo    Response to treatment:  Well tolerated by patient. Pain Assessment:  Severity:  0 / 10  Quality of pain: N/A  Wound Pain Timing/Severity: none  Premedicated: N/A    Plan  RLE:  Cleanse wound with NS daily, pat dry. Leave DARIEN. Plan of Care: Wound 09/24/22 Pretibial Right; Anterior large scabbed area R shin-Dressing/Treatment: Open to air  Wound 09/24/22 Heel Left Black blistered areas-Dressing/Treatment: Open to air  Wound 09/24/22 Buttocks Inner 2 X 2 Stage 2 pressure injury-Dressing/Treatment: Barrier film  Wound 09/24/22 Arm Distal;Left;Lower 2 X 1 scabbed wound to L wrist-Dressing/Treatment: Open to air    Specialty Bed Required : N/A   [] Low Air Loss   [] Pressure Redistribution  [] Fluid Immersion  [] Bariatric  [] Total Pressure Relief  [] Other:     Current Diet: ADULT DIET; Regular; No Caffeine  ADULT ORAL NUTRITION SUPPLEMENT; Breakfast, Lunch, Dinner;  Low Calorie/High Protein Oral Supplement  Dietician consult:  Yes    Discharge Plan:  Placement for patient upon discharge: home with support    Patient appropriate for Outpatient [address]: Yes    Referrals:  [x]   [] [facility]  [] Supplies  [] Other    Patient/Caregiver Teaching:  Level of patient/caregiver understanding able to:   [] Indicates understanding       [] Needs reinforcement  [] Unsuccessful      [x] Verbal Understanding  [] Demonstrated understanding       [] No evidence of learning  [] Refused teaching         [] N/A       Electronically signed by Sebastien Guerrero RN, CWOCN on 9/26/2022 at 12:32 PM

## 2022-09-27 ENCOUNTER — CARE COORDINATION (OUTPATIENT)
Dept: CASE MANAGEMENT | Age: 52
End: 2022-09-27

## 2022-09-27 LAB
BLOOD CULTURE, ROUTINE: NORMAL
CULTURE, BLOOD 2: NORMAL

## 2022-09-27 NOTE — CARE COORDINATION
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       Date Complaint Diagnosis Description Type Department Provider    22 Diarrhea COVID . Jesus Loaiza . ED to Hosp-Admission (Discharged) (ADMITTED) [address] PCU Zach Perez MD; Kanu Orozco . ..          1st attempt - Unable to reach or leave message \"the called party is temporarily unavailable\"      Follow Up  Future Appointments   Date Time Provider Ventura Rodriguez   10/31/2022  9:15 AM Yg Kern APRN - CNP [facility] Ordonez, CHRIS

## 2022-09-28 ENCOUNTER — CARE COORDINATION (OUTPATIENT)
Dept: CASE MANAGEMENT | Age: 52
End: 2022-09-28

## 2022-09-28 NOTE — CARE COORDINATION
Care Transitions Outreach Attempt    Call within 2 business days of discharge: Yes   Attempted to reach patient for transitions of care follow up. Unable to reach patient. No further CTN outreach scheduled. Episode resolved. Patient: Anne Helton Patient : 1970 MRN: [id number]    Last Discharge [address]       Date Complaint Diagnosis Description Type Department Provider    22 Diarrhea COVID . Catalina Carreno . ED to Hosp-Admission (Discharged) (ADMITTED) [facility] PCU Radha Diez MD; [facility] . .. Was this an external facility discharge?  No Discharge Facility: NA    Noted following upcoming appointments from discharge chart review:   [facility] follow up appointment(s):   Future Appointments   Date Time Provider Ventura Rodriguez   10/31/2022  9:15 AM PHANI Bhagat - CNP [facility]     Non-[facility] follow up appointment(s): unknown    Jori Varela RN  Care Transition Nurse  [phone] mobile

## 2022-10-03 PROBLEM — K56.7 ILEUS (HCC): Status: ACTIVE | Noted: 2022-10-03
